# Patient Record
Sex: FEMALE | Race: WHITE | Employment: UNEMPLOYED | URBAN - METROPOLITAN AREA
[De-identification: names, ages, dates, MRNs, and addresses within clinical notes are randomized per-mention and may not be internally consistent; named-entity substitution may affect disease eponyms.]

---

## 2017-03-06 ENCOUNTER — OFFICE VISIT (OUTPATIENT)
Dept: OBGYN CLINIC | Facility: CLINIC | Age: 22
End: 2017-03-06

## 2017-03-06 VITALS
DIASTOLIC BLOOD PRESSURE: 62 MMHG | HEIGHT: 64 IN | SYSTOLIC BLOOD PRESSURE: 100 MMHG | WEIGHT: 122 LBS | BODY MASS INDEX: 20.83 KG/M2

## 2017-03-06 DIAGNOSIS — Z30.013 ENCOUNTER FOR INITIAL PRESCRIPTION OF INJECTABLE CONTRACEPTIVE: Primary | ICD-10-CM

## 2017-03-06 DIAGNOSIS — N92.1 METRORRHAGIA: ICD-10-CM

## 2017-03-06 DIAGNOSIS — F32.0 MILD SINGLE CURRENT EPISODE OF MAJOR DEPRESSIVE DISORDER (HCC): ICD-10-CM

## 2017-03-06 PROCEDURE — 96372 THER/PROPH/DIAG INJ SC/IM: CPT | Performed by: OBSTETRICS & GYNECOLOGY

## 2017-03-06 PROCEDURE — 99213 OFFICE O/P EST LOW 20 MIN: CPT | Performed by: OBSTETRICS & GYNECOLOGY

## 2017-03-06 RX ORDER — MEDROXYPROGESTERONE ACETATE 10 MG/1
10 TABLET ORAL DAILY
Qty: 10 TABLET | Refills: 0 | Status: SHIPPED | OUTPATIENT
Start: 2017-03-06 | End: 2017-03-16

## 2017-03-06 RX ORDER — MEDROXYPROGESTERONE ACETATE 150 MG/ML
150 INJECTION, SUSPENSION INTRAMUSCULAR ONCE
Status: COMPLETED | OUTPATIENT
Start: 2017-03-06 | End: 2017-03-06

## 2017-03-06 RX ORDER — SERTRALINE HYDROCHLORIDE 25 MG/1
TABLET, FILM COATED ORAL
Qty: 120 TABLET | Refills: 4 | Status: SHIPPED | OUTPATIENT
Start: 2017-03-06 | End: 2017-03-07 | Stop reason: CLARIF

## 2017-03-06 RX ADMIN — MEDROXYPROGESTERONE ACETATE 150 MG: 150 INJECTION, SUSPENSION INTRAMUSCULAR at 14:09:00

## 2017-03-06 NOTE — PROGRESS NOTES
GYNECOLOGY RETURN VISIT    3/6/2017  1:51 PM    CC:Patient presents for prolonged bleeding     HPI: Patient is a 22year old  here for management of depoprovera.   She has noticed that about every 3 months when her depo is due she has prolonged spott Breastfeeding? No          A/P: Patient is 22year old female here for prolonged bleeding on depo. Plan to check gc/chl.  I will give her provera if her bleeding persists. I dw her other bc options, but she declines due to her h/o migraine HA.   I also dw h

## 2017-03-07 ENCOUNTER — TELEPHONE (OUTPATIENT)
Dept: OBGYN CLINIC | Facility: CLINIC | Age: 22
End: 2017-03-07

## 2017-03-07 RX ORDER — SERTRALINE HYDROCHLORIDE 25 MG/1
25 TABLET, FILM COATED ORAL DAILY
Qty: 120 TABLET | Refills: 3 | Status: SHIPPED | OUTPATIENT
Start: 2017-03-07 | End: 2017-08-11

## 2017-05-23 ENCOUNTER — NURSE ONLY (OUTPATIENT)
Dept: OBGYN CLINIC | Facility: CLINIC | Age: 22
End: 2017-05-23

## 2017-05-23 DIAGNOSIS — Z30.013 INITIATION OF DEPO PROVERA: ICD-10-CM

## 2017-05-23 PROCEDURE — 96372 THER/PROPH/DIAG INJ SC/IM: CPT | Performed by: OBSTETRICS & GYNECOLOGY

## 2017-05-23 RX ORDER — MEDROXYPROGESTERONE ACETATE 150 MG/ML
150 INJECTION, SUSPENSION INTRAMUSCULAR ONCE
Status: COMPLETED | OUTPATIENT
Start: 2017-05-23 | End: 2017-05-23

## 2017-05-23 RX ADMIN — MEDROXYPROGESTERONE ACETATE 150 MG: 150 INJECTION, SUSPENSION INTRAMUSCULAR at 09:18:00

## 2017-08-11 ENCOUNTER — OFFICE VISIT (OUTPATIENT)
Dept: OBGYN CLINIC | Facility: CLINIC | Age: 22
End: 2017-08-11

## 2017-08-11 VITALS
HEIGHT: 64 IN | BODY MASS INDEX: 20.49 KG/M2 | SYSTOLIC BLOOD PRESSURE: 100 MMHG | DIASTOLIC BLOOD PRESSURE: 76 MMHG | WEIGHT: 120 LBS

## 2017-08-11 DIAGNOSIS — Z30.42 ENCOUNTER FOR MANAGEMENT AND INJECTION OF DEPO-PROVERA: Primary | ICD-10-CM

## 2017-08-11 PROCEDURE — 96372 THER/PROPH/DIAG INJ SC/IM: CPT | Performed by: OBSTETRICS & GYNECOLOGY

## 2017-08-11 RX ORDER — MEDROXYPROGESTERONE ACETATE 10 MG/1
10 TABLET ORAL DAILY
Qty: 10 TABLET | Refills: 3 | Status: SHIPPED | OUTPATIENT
Start: 2017-08-11 | End: 2017-08-21

## 2017-08-11 RX ORDER — MEDROXYPROGESTERONE ACETATE 150 MG/ML
150 INJECTION, SUSPENSION INTRAMUSCULAR ONCE
Status: COMPLETED | OUTPATIENT
Start: 2017-08-11 | End: 2017-08-11

## 2017-08-11 RX ADMIN — MEDROXYPROGESTERONE ACETATE 150 MG: 150 INJECTION, SUSPENSION INTRAMUSCULAR at 13:32:00

## 2017-08-11 NOTE — PROGRESS NOTES
GYNECOLOGY RETURN VISIT          2017  1:35 PM    CC:Patient presents for depo injections    HPI: Patient is a 22year old  Patient's last menstrual period was 2017. who presents for above.  Bleeding seems to be better, but she would like

## 2017-10-13 ENCOUNTER — OFFICE VISIT (OUTPATIENT)
Dept: OBGYN CLINIC | Facility: CLINIC | Age: 22
End: 2017-10-13

## 2017-10-13 VITALS — WEIGHT: 120 LBS | SYSTOLIC BLOOD PRESSURE: 100 MMHG | BODY MASS INDEX: 21 KG/M2 | DIASTOLIC BLOOD PRESSURE: 76 MMHG

## 2017-10-13 DIAGNOSIS — N76.4 LABIAL ABSCESS: Primary | ICD-10-CM

## 2017-10-13 PROCEDURE — 99213 OFFICE O/P EST LOW 20 MIN: CPT | Performed by: OBSTETRICS & GYNECOLOGY

## 2017-10-13 RX ORDER — MEDROXYPROGESTERONE ACETATE 150 MG/ML
INJECTION, SUSPENSION INTRAMUSCULAR
COMMUNITY
End: 2017-11-15

## 2017-10-13 RX ORDER — DOXYCYCLINE HYCLATE 100 MG
100 TABLET ORAL 2 TIMES DAILY
Qty: 20 TABLET | Refills: 0 | Status: SHIPPED | OUTPATIENT
Start: 2017-10-13 | End: 2017-10-23

## 2017-10-13 NOTE — PROGRESS NOTES
GYNECOLOGY RETURN VISIT          10/13/2017  1:07 PM    CC:Patient presents l    HPI: Patient is a 22year old  No LMP recorded (lmp unknown). Patient has had an injection.  who presents with lumps on R side of bikini line for 3 W and another on the abscess/hydradenitis. RX sent in for doxy 100 bid x 10 days. FU in 1 week if no improvement.     Jabari Monae MD

## 2017-11-14 ENCOUNTER — APPOINTMENT (OUTPATIENT)
Dept: LAB | Facility: REFERENCE LAB | Age: 22
End: 2017-11-14
Attending: OBSTETRICS & GYNECOLOGY
Payer: COMMERCIAL

## 2017-11-14 ENCOUNTER — TELEPHONE (OUTPATIENT)
Dept: OBGYN CLINIC | Facility: CLINIC | Age: 22
End: 2017-11-14

## 2017-11-14 PROBLEM — N94.10 FEMALE DYSPAREUNIA: Status: ACTIVE | Noted: 2017-11-14

## 2017-11-14 PROBLEM — F32.9 REACTIVE DEPRESSION: Status: ACTIVE | Noted: 2017-11-14

## 2017-11-14 PROCEDURE — 36415 COLL VENOUS BLD VENIPUNCTURE: CPT | Performed by: OBSTETRICS & GYNECOLOGY

## 2017-11-14 PROCEDURE — 84702 CHORIONIC GONADOTROPIN TEST: CPT | Performed by: OBSTETRICS & GYNECOLOGY

## 2017-11-14 NOTE — PROGRESS NOTES
GYNECOLOGY RETURN VISIT          2017  12:13 PM    CC:Patient presents for depo. HPI: Patient is a 25year old  No LMP recorded. Patient has had an injection. who presents for depo. She is two weeks late and needs quant.  She is also concern Birth control/ protection: Depo Provera     Other Topics Concern    Blood Transfusions No     Social History Narrative    No h/o abuse           Review of Systems:    See above HPI. BP 96/62   Ht 64\"   Wt 125 lb   Breastfeeding?  No   BMI 21.46 kg/m²

## 2017-11-14 NOTE — TELEPHONE ENCOUNTER
Fax rec'd from [facility]  for refill on Escitalopram. Dr. Sheryl García saw pt today and refill + 11 was sent to pts [address]

## 2017-11-15 ENCOUNTER — NURSE ONLY (OUTPATIENT)
Dept: OBGYN CLINIC | Facility: CLINIC | Age: 22
End: 2017-11-15

## 2017-11-15 PROCEDURE — 96372 THER/PROPH/DIAG INJ SC/IM: CPT | Performed by: OBSTETRICS & GYNECOLOGY

## 2017-11-15 RX ORDER — MEDROXYPROGESTERONE ACETATE 150 MG/ML
150 INJECTION, SUSPENSION INTRAMUSCULAR ONCE
Status: COMPLETED | OUTPATIENT
Start: 2017-11-15 | End: 2017-11-15

## 2017-11-15 RX ADMIN — MEDROXYPROGESTERONE ACETATE 150 MG: 150 INJECTION, SUSPENSION INTRAMUSCULAR at 16:29:00

## 2017-11-17 ENCOUNTER — TELEPHONE (OUTPATIENT)
Dept: OBGYN CLINIC | Facility: CLINIC | Age: 22
End: 2017-11-17

## 2017-11-17 RX ORDER — ESCITALOPRAM OXALATE 10 MG/1
10 TABLET ORAL DAILY
Qty: 90 TABLET | Refills: 3 | Status: SHIPPED | OUTPATIENT
Start: 2017-11-17 | End: 2019-10-22

## 2017-11-17 NOTE — TELEPHONE ENCOUNTER
Per pt, she would like to change her Escitalopram script to be sent to [facility] as it will be less expensive. 3 month supply sent with 3 refills. Let pt and [facility] pharmacy know.

## 2018-02-14 ENCOUNTER — NURSE ONLY (OUTPATIENT)
Dept: OBGYN CLINIC | Facility: CLINIC | Age: 23
End: 2018-02-14

## 2018-02-14 PROCEDURE — 96372 THER/PROPH/DIAG INJ SC/IM: CPT | Performed by: OBSTETRICS & GYNECOLOGY

## 2018-02-14 RX ORDER — MEDROXYPROGESTERONE ACETATE 150 MG/ML
INJECTION, SUSPENSION INTRAMUSCULAR ONCE
Status: COMPLETED | OUTPATIENT
Start: 2018-02-14 | End: 2018-02-14

## 2018-02-14 RX ADMIN — MEDROXYPROGESTERONE ACETATE 150 MG: 150 INJECTION, SUSPENSION INTRAMUSCULAR at 13:44:00

## 2018-05-11 ENCOUNTER — NURSE ONLY (OUTPATIENT)
Dept: OBGYN CLINIC | Facility: CLINIC | Age: 23
End: 2018-05-11

## 2018-05-11 PROCEDURE — 96372 THER/PROPH/DIAG INJ SC/IM: CPT | Performed by: OBSTETRICS & GYNECOLOGY

## 2018-05-11 RX ORDER — MEDROXYPROGESTERONE ACETATE 150 MG/ML
150 INJECTION, SUSPENSION INTRAMUSCULAR ONCE
Status: COMPLETED | OUTPATIENT
Start: 2018-05-11 | End: 2018-05-11

## 2018-05-11 RX ADMIN — MEDROXYPROGESTERONE ACETATE 150 MG: 150 INJECTION, SUSPENSION INTRAMUSCULAR at 14:07:00

## 2018-08-15 ENCOUNTER — NURSE ONLY (OUTPATIENT)
Dept: OBGYN CLINIC | Facility: CLINIC | Age: 23
End: 2018-08-15
Payer: COMMERCIAL

## 2018-08-15 ENCOUNTER — APPOINTMENT (OUTPATIENT)
Dept: LAB | Facility: REFERENCE LAB | Age: 23
End: 2018-08-15
Attending: OBSTETRICS & GYNECOLOGY
Payer: COMMERCIAL

## 2018-08-15 DIAGNOSIS — Z30.42 DEPO-PROVERA CONTRACEPTIVE STATUS: Primary | ICD-10-CM

## 2018-08-15 LAB — B-HCG SERPL-ACNC: <0.6 MIU/ML

## 2018-08-15 PROCEDURE — 36415 COLL VENOUS BLD VENIPUNCTURE: CPT | Performed by: OBSTETRICS & GYNECOLOGY

## 2018-08-15 PROCEDURE — 84702 CHORIONIC GONADOTROPIN TEST: CPT | Performed by: OBSTETRICS & GYNECOLOGY

## 2018-08-16 ENCOUNTER — NURSE ONLY (OUTPATIENT)
Dept: OBGYN CLINIC | Facility: CLINIC | Age: 23
End: 2018-08-16
Payer: COMMERCIAL

## 2018-08-16 PROCEDURE — 96372 THER/PROPH/DIAG INJ SC/IM: CPT | Performed by: OBSTETRICS & GYNECOLOGY

## 2018-08-16 RX ORDER — MEDROXYPROGESTERONE ACETATE 150 MG/ML
150 INJECTION, SUSPENSION INTRAMUSCULAR ONCE
Status: COMPLETED | OUTPATIENT
Start: 2018-08-16 | End: 2018-08-16

## 2018-08-16 RX ADMIN — MEDROXYPROGESTERONE ACETATE 150 MG: 150 INJECTION, SUSPENSION INTRAMUSCULAR at 13:56:00

## 2018-08-16 NOTE — PROGRESS NOTES
Pt here for Nurse visit for Depo injection. Depo given in left outer glut, pt supriya injection well. Next injection due Nov 1-Nov 15.

## 2018-12-03 ENCOUNTER — OFFICE VISIT (OUTPATIENT)
Dept: OBGYN CLINIC | Facility: CLINIC | Age: 23
End: 2018-12-03
Payer: COMMERCIAL

## 2018-12-03 VITALS — HEIGHT: 64 IN | BODY MASS INDEX: 21 KG/M2 | DIASTOLIC BLOOD PRESSURE: 74 MMHG | SYSTOLIC BLOOD PRESSURE: 108 MMHG

## 2018-12-03 DIAGNOSIS — N90.89 VULVAR LESION: Primary | ICD-10-CM

## 2018-12-03 DIAGNOSIS — L70.0 ACNE VULGARIS: ICD-10-CM

## 2018-12-03 DIAGNOSIS — R68.82 LOW LIBIDO: ICD-10-CM

## 2018-12-03 DIAGNOSIS — Z30.09 FAMILY PLANNING: ICD-10-CM

## 2018-12-03 PROCEDURE — 99214 OFFICE O/P EST MOD 30 MIN: CPT | Performed by: OBSTETRICS & GYNECOLOGY

## 2018-12-03 PROCEDURE — 56405 I&D VULVA/PERINEAL ABSCESS: CPT | Performed by: OBSTETRICS & GYNECOLOGY

## 2018-12-03 RX ORDER — CLINDAMYCIN PHOSPHATE 10 MG/ML
1 LOTION TOPICAL DAILY
Qty: 60 ML | Refills: 0 | Status: SHIPPED | OUTPATIENT
Start: 2018-12-03 | End: 2019-10-22

## 2018-12-03 RX ORDER — ADAPALENE 45 G/G
GEL TOPICAL
Qty: 45 G | Refills: 2 | Status: SHIPPED | OUTPATIENT
Start: 2018-12-03 | End: 2019-10-22

## 2018-12-03 RX ORDER — CLONAZEPAM 0.5 MG/1
TABLET ORAL
Refills: 3 | COMMUNITY
Start: 2018-10-01 | End: 2019-10-22

## 2018-12-03 RX ORDER — NORGESTIMATE AND ETHINYL ESTRADIOL 7DAYSX3 28
1 KIT ORAL DAILY
Qty: 28 TABLET | Refills: 11 | Status: SHIPPED | OUTPATIENT
Start: 2018-12-03 | End: 2018-12-31

## 2018-12-03 NOTE — PROGRESS NOTES
CC: Patient is here for blocked sweat glands L groin area. HPI: Patient is a 26year old  for above. Patient had a previous problem and was treated with oral antibiotics 6 M ago.      She also would like to switch off of depo b/c she feels like it inability: Not on file      Transportation needs - medical: Not on file      Transportation needs - non-medical: Not on file    Occupational History      Occupation: student,     Tobacco Use      Smoking status: Never Smoker      Smokeless tobacc Depoprovera, IUD (Mirena/Paragard), and Nexplanon. Risks v. Benefits dw patient. I also dw pt use of condoms to prevent STI's. She would like OCP's.  I dw her use and SE.    4.) depression - much better on Lexapro.    5.) acne - adapalene and clindamycin

## 2019-10-22 ENCOUNTER — OFFICE VISIT (OUTPATIENT)
Dept: OBGYN CLINIC | Facility: CLINIC | Age: 24
End: 2019-10-22
Payer: COMMERCIAL

## 2019-10-22 VITALS
WEIGHT: 114 LBS | DIASTOLIC BLOOD PRESSURE: 80 MMHG | HEIGHT: 64 IN | SYSTOLIC BLOOD PRESSURE: 120 MMHG | BODY MASS INDEX: 19.46 KG/M2

## 2019-10-22 DIAGNOSIS — Z01.419 ENCOUNTER FOR GYNECOLOGICAL EXAMINATION WITHOUT ABNORMAL FINDING: Primary | ICD-10-CM

## 2019-10-22 DIAGNOSIS — Z30.09 FAMILY PLANNING: ICD-10-CM

## 2019-10-22 PROCEDURE — 87491 CHLMYD TRACH DNA AMP PROBE: CPT | Performed by: OBSTETRICS & GYNECOLOGY

## 2019-10-22 PROCEDURE — 99395 PREV VISIT EST AGE 18-39: CPT | Performed by: OBSTETRICS & GYNECOLOGY

## 2019-10-22 PROCEDURE — 87591 N.GONORRHOEAE DNA AMP PROB: CPT | Performed by: OBSTETRICS & GYNECOLOGY

## 2019-10-22 PROCEDURE — 88175 CYTOPATH C/V AUTO FLUID REDO: CPT | Performed by: OBSTETRICS & GYNECOLOGY

## 2019-10-22 RX ORDER — MISOPROSTOL 200 UG/1
TABLET ORAL
Qty: 2 TABLET | Refills: 0 | Status: SHIPPED | OUTPATIENT
Start: 2019-10-22 | End: 2019-10-29

## 2019-10-22 RX ORDER — NORGESTIMATE AND ETHINYL ESTRADIOL 7DAYSX3 28
KIT ORAL
Refills: 10 | COMMUNITY
Start: 2019-10-04 | End: 2019-10-29

## 2019-10-25 NOTE — PROGRESS NOTES
Results reviewed with pt and pt scheduled for colpo on 10/29. Pt verbalized understanding and agrees with plan of care.

## 2019-10-29 ENCOUNTER — OFFICE VISIT (OUTPATIENT)
Dept: OBGYN CLINIC | Facility: CLINIC | Age: 24
End: 2019-10-29
Payer: COMMERCIAL

## 2019-10-29 VITALS
WEIGHT: 114 LBS | HEIGHT: 64 IN | DIASTOLIC BLOOD PRESSURE: 80 MMHG | BODY MASS INDEX: 19.46 KG/M2 | SYSTOLIC BLOOD PRESSURE: 120 MMHG

## 2019-10-29 DIAGNOSIS — Z01.419 ENCOUNTER FOR GYNECOLOGICAL EXAMINATION WITHOUT ABNORMAL FINDING: Primary | ICD-10-CM

## 2019-10-29 DIAGNOSIS — R87.611 PAP SMEAR OF CERVIX WITH ASCUS, CANNOT EXCLUDE HGSIL: ICD-10-CM

## 2019-10-29 PROCEDURE — 57454 BX/CURETT OF CERVIX W/SCOPE: CPT | Performed by: OBSTETRICS & GYNECOLOGY

## 2019-10-29 PROCEDURE — 88305 TISSUE EXAM BY PATHOLOGIST: CPT | Performed by: OBSTETRICS & GYNECOLOGY

## 2019-10-29 PROCEDURE — 90651 9VHPV VACCINE 2/3 DOSE IM: CPT | Performed by: OBSTETRICS & GYNECOLOGY

## 2019-10-29 PROCEDURE — 81025 URINE PREGNANCY TEST: CPT | Performed by: OBSTETRICS & GYNECOLOGY

## 2019-10-29 PROCEDURE — 90471 IMMUNIZATION ADMIN: CPT | Performed by: OBSTETRICS & GYNECOLOGY

## 2019-10-29 NOTE — PROGRESS NOTES
Colposcopy    Pap = ASCUS cannot r/o HGSIL. She recently found out she did not do Gardasil at all. Patient was positioned in stir-ups. She was consented for colposcopy and possible biopsies.  I discussed with her to expect some cramping and light vagi

## 2019-11-01 ENCOUNTER — TELEPHONE (OUTPATIENT)
Dept: OBGYN CLINIC | Facility: CLINIC | Age: 24
End: 2019-11-01

## 2019-11-05 NOTE — TELEPHONE ENCOUNTER
Called and dw pt biopsies c/w SARAHI 1 - 2. I dw her that on colposcopy she had a large area of her cervix which appeared to be involved, so I do not recommend conservative management. Plan office LEEP.  I dw pt the procedure, preop, postop and risks including

## 2019-11-14 ENCOUNTER — OFFICE VISIT (OUTPATIENT)
Dept: OBGYN CLINIC | Facility: CLINIC | Age: 24
End: 2019-11-14
Payer: COMMERCIAL

## 2019-11-14 VITALS
HEIGHT: 64 IN | SYSTOLIC BLOOD PRESSURE: 110 MMHG | BODY MASS INDEX: 20.14 KG/M2 | DIASTOLIC BLOOD PRESSURE: 78 MMHG | WEIGHT: 118 LBS

## 2019-11-14 DIAGNOSIS — N87.1 MODERATE DYSPLASIA OF CERVIX (CIN II): Primary | ICD-10-CM

## 2019-11-14 PROCEDURE — 81025 URINE PREGNANCY TEST: CPT | Performed by: OBSTETRICS & GYNECOLOGY

## 2019-11-14 PROCEDURE — 88307 TISSUE EXAM BY PATHOLOGIST: CPT | Performed by: OBSTETRICS & GYNECOLOGY

## 2019-11-14 PROCEDURE — 57522 CONIZATION OF CERVIX: CPT | Performed by: OBSTETRICS & GYNECOLOGY

## 2019-11-14 NOTE — PROGRESS NOTES
Pap = ASCUS cannot r./o HGSIL    Colpo bx = rick ii    Here for LEEP. I dw pt the pathology results, how the LEEP is performed, and to expect some light vaginal bleeding and lower abdominal cramping.  I discussed the risks of the procedure including bleedin

## 2019-12-03 ENCOUNTER — OFFICE VISIT (OUTPATIENT)
Dept: OBGYN CLINIC | Facility: CLINIC | Age: 24
End: 2019-12-03
Payer: COMMERCIAL

## 2019-12-03 VITALS — BODY MASS INDEX: 20 KG/M2 | DIASTOLIC BLOOD PRESSURE: 78 MMHG | SYSTOLIC BLOOD PRESSURE: 112 MMHG | HEIGHT: 64 IN

## 2019-12-03 DIAGNOSIS — Z09 POSTOP CHECK: Primary | ICD-10-CM

## 2019-12-03 DIAGNOSIS — Z30.430 ENCOUNTER FOR INSERTION OF INTRAUTERINE CONTRACEPTIVE DEVICE: ICD-10-CM

## 2019-12-03 PROBLEM — D06.9 CIN III (CERVICAL INTRAEPITHELIAL NEOPLASIA GRADE III) WITH SEVERE DYSPLASIA: Status: ACTIVE | Noted: 2019-12-03

## 2019-12-03 PROCEDURE — 99024 POSTOP FOLLOW-UP VISIT: CPT | Performed by: OBSTETRICS & GYNECOLOGY

## 2019-12-03 PROCEDURE — 58300 INSERT INTRAUTERINE DEVICE: CPT | Performed by: OBSTETRICS & GYNECOLOGY

## 2019-12-03 RX ORDER — DEXTROAMPHETAMINE SACCHARATE, AMPHETAMINE ASPARTATE, DEXTROAMPHETAMINE SULFATE AND AMPHETAMINE SULFATE 5; 5; 5; 5 MG/1; MG/1; MG/1; MG/1
TABLET ORAL
Refills: 0 | COMMUNITY
Start: 2019-12-01

## 2019-12-03 NOTE — PROGRESS NOTES
CC: Patient is here for postop FU    HPI: Patient is a 22year old  for postop FU after LEEP. Some cramping and spotting. No heavy bleeding. Would like Mirena IUD placed. Has not had sex since prior to the procedure. No LMP recorded.  Patient has Not on file      Intimate partner violence:        Fear of current or ex partner: Not on file        Emotionally abused: Not on file        Physically abused: Not on file        Forced sexual activity: Not on file    Other Topics      Concerns:        Melanie bleeding which could last up to 90 days as well as some short term lower abdominal cramping. I discussed with her to call me if she noticed worsening lower abdominal pain, fever, chills, nausea, vomiting, or a foul vaginal discharge.   I also discussed wit

## 2020-01-08 ENCOUNTER — NURSE ONLY (OUTPATIENT)
Dept: OBGYN CLINIC | Facility: CLINIC | Age: 25
End: 2020-01-08
Payer: COMMERCIAL

## 2020-01-08 PROCEDURE — 90651 9VHPV VACCINE 2/3 DOSE IM: CPT | Performed by: OBSTETRICS & GYNECOLOGY

## 2020-01-08 PROCEDURE — 90471 IMMUNIZATION ADMIN: CPT | Performed by: OBSTETRICS & GYNECOLOGY

## 2020-01-08 NOTE — PROGRESS NOTES
Pt here for nurse visit for 2nd gardasil injection given left arm . Pt denies any allergies two identifiers verified with pt. Pt tolerated injection well RTO 04/2020 for last Injection.

## 2020-05-26 ENCOUNTER — OFFICE VISIT (OUTPATIENT)
Dept: OBGYN CLINIC | Facility: CLINIC | Age: 25
End: 2020-05-26
Payer: COMMERCIAL

## 2020-05-26 VITALS
DIASTOLIC BLOOD PRESSURE: 70 MMHG | BODY MASS INDEX: 19.31 KG/M2 | HEIGHT: 64 IN | WEIGHT: 113.13 LBS | SYSTOLIC BLOOD PRESSURE: 116 MMHG

## 2020-05-26 DIAGNOSIS — D06.9 CIN III (CERVICAL INTRAEPITHELIAL NEOPLASIA GRADE III) WITH SEVERE DYSPLASIA: Primary | ICD-10-CM

## 2020-05-26 PROCEDURE — 90471 IMMUNIZATION ADMIN: CPT | Performed by: OBSTETRICS & GYNECOLOGY

## 2020-05-26 PROCEDURE — 90651 9VHPV VACCINE 2/3 DOSE IM: CPT | Performed by: OBSTETRICS & GYNECOLOGY

## 2020-05-26 PROCEDURE — 99213 OFFICE O/P EST LOW 20 MIN: CPT | Performed by: OBSTETRICS & GYNECOLOGY

## 2020-05-26 PROCEDURE — 88175 CYTOPATH C/V AUTO FLUID REDO: CPT | Performed by: OBSTETRICS & GYNECOLOGY

## 2020-05-26 PROCEDURE — 87624 HPV HI-RISK TYP POOLED RSLT: CPT | Performed by: OBSTETRICS & GYNECOLOGY

## 2020-05-26 NOTE — PROGRESS NOTES
3rd Gardasil 9 Vaccine administered in Left arm IM  Vaccine given at 10:19AM.  Patient tolerated well no reaction at this time. 2 patient identifiers verified with pt. Ordering Dr. Lissett Sow.

## 2020-05-26 NOTE — PROGRESS NOTES
CC: Patient is here for pap    HPI: Patient is a 22year old  for FU pap after LEEP. Also getting Gardasil #3. She is not getting periods with Mirena IUD. No LMP recorded (lmp unknown). (Menstrual status: IUD - Intrauterine Device).     OB Hist activity: Yes        Partners: Male        Birth control/protection: Mirena    Lifestyle      Physical activity:        Days per week: Not on file        Minutes per session: Not on file      Stress: Not on file    Relationships      Social connections: non-tender, firm and smooth  Cervix: pink, no lesions grossly visible, no discharge, IUD string visible. Adnexa: non tender, no palpable masses, normal size  Anus:  No lesions or visible hemorrhoids        A/P: Patient is 22year old female     1.  SARAHI II

## 2020-12-08 PROBLEM — F32.81 PMDD (PREMENSTRUAL DYSPHORIC DISORDER): Status: ACTIVE | Noted: 2020-12-08

## 2020-12-08 NOTE — PROGRESS NOTES
CC: Patient is here for PMDD    HPI: Patient is a 26year old  for possible PMDD.  She has Mirena IUD    She describes more noticeable in the past year PMDD.     2 W prior to her period she feels like her ADHD worsens, she is \"down in the dumps,\" e Past Surgical History:   Procedure Laterality Date   • COLPOSCOPY, CERVIX W/UPPER ADJACENT VAGINA; W/BIOPSY(S), CERVIX  10/29/2019   • INSERT INTRAUTERINE DEVICE  12/2019    Mirena IUD    • LEEP  11/2019    SARAHI iii with positive margins   • TONSILLECTO Caffeine Concern: Not Asked        Occupational Exposure: Not Asked        Hobby Hazards: Not Asked        Sleep Concern: Not Asked        Stress Concern: Not Asked        Weight Concern: Not Asked        Special Diet: Not Asked        Back Care: Not Asked

## 2021-01-28 ENCOUNTER — PATIENT MESSAGE (OUTPATIENT)
Dept: OBGYN CLINIC | Facility: CLINIC | Age: 26
End: 2021-01-28

## 2021-01-28 NOTE — TELEPHONE ENCOUNTER
LILIANAM that scheduled for tomorrow with [facility] and my chart message. Eunice Boston,      I tried to call you to assist you with finding an appointment but instead left a message. I placed you in a slot for tomorrow at 1:30 pm with Dr. Ramirez Xie.   Please call

## 2021-01-29 ENCOUNTER — PATIENT MESSAGE (OUTPATIENT)
Dept: OBGYN CLINIC | Facility: CLINIC | Age: 26
End: 2021-01-29

## 2021-01-29 ENCOUNTER — OFFICE VISIT (OUTPATIENT)
Dept: OBGYN CLINIC | Facility: CLINIC | Age: 26
End: 2021-01-29
Payer: COMMERCIAL

## 2021-01-29 VITALS
DIASTOLIC BLOOD PRESSURE: 70 MMHG | SYSTOLIC BLOOD PRESSURE: 100 MMHG | HEIGHT: 64 IN | BODY MASS INDEX: 19.46 KG/M2 | WEIGHT: 114 LBS

## 2021-01-29 DIAGNOSIS — R10.2 PELVIC PAIN: Primary | ICD-10-CM

## 2021-01-29 DIAGNOSIS — Z30.09 FAMILY PLANNING: ICD-10-CM

## 2021-01-29 DIAGNOSIS — Z30.432 ENCOUNTER FOR REMOVAL OF INTRAUTERINE CONTRACEPTIVE DEVICE: ICD-10-CM

## 2021-01-29 PROCEDURE — 58301 REMOVE INTRAUTERINE DEVICE: CPT | Performed by: OBSTETRICS & GYNECOLOGY

## 2021-01-29 PROCEDURE — 3074F SYST BP LT 130 MM HG: CPT | Performed by: OBSTETRICS & GYNECOLOGY

## 2021-01-29 PROCEDURE — 3008F BODY MASS INDEX DOCD: CPT | Performed by: OBSTETRICS & GYNECOLOGY

## 2021-01-29 PROCEDURE — 87591 N.GONORRHOEAE DNA AMP PROB: CPT | Performed by: OBSTETRICS & GYNECOLOGY

## 2021-01-29 PROCEDURE — 99214 OFFICE O/P EST MOD 30 MIN: CPT | Performed by: OBSTETRICS & GYNECOLOGY

## 2021-01-29 PROCEDURE — 3078F DIAST BP <80 MM HG: CPT | Performed by: OBSTETRICS & GYNECOLOGY

## 2021-01-29 PROCEDURE — 87491 CHLMYD TRACH DNA AMP PROBE: CPT | Performed by: OBSTETRICS & GYNECOLOGY

## 2021-01-29 RX ORDER — NORGESTIMATE AND ETHINYL ESTRADIOL 7DAYSX3 LO
1 KIT ORAL DAILY
Qty: 1 PACKAGE | Refills: 11 | Status: SHIPPED | OUTPATIENT
Start: 2021-01-29 | End: 2021-02-26

## 2021-01-29 RX ORDER — ACETAMINOPHEN 500 MG
500 TABLET ORAL EVERY 6 HOURS PRN
COMMUNITY
End: 2021-12-06

## 2021-01-29 NOTE — TELEPHONE ENCOUNTER
RN contacted pt. Pt requesting an alternate location to have ultrasound done sooner than her scheduled US on 2/8/21.  RN placed pelvic transvaginal ultrasound order and provided number to central scheduling so pt could call to schedule ultrasound at [facility]

## 2021-01-29 NOTE — TELEPHONE ENCOUNTER
From: Tereso Schuler  To:  Airam Darling MD  Sent: 1/29/2021 2:36 PM CST  Subject: Referral Request    I am scheduled for a US TRANSVAGINAL EMG ONLY and I wasn't able to get an appointment until February 8th and I was wondering if there w

## 2021-01-29 NOTE — PROGRESS NOTES
CC: Patient is here for Left sided pelvic pain. HPI: Patient is a 26year old  with Mirena IUD x 1 year presents with  LLQ pain x 1. 5 W described as constant crampy pain which can get worse with bloating. Pain can be 4-7/10 in severity.  She has intraepithelial lesion (HGSIL) on cervicovaginal cytology 10/22/2019    ASC-H   • History of use of contraceptive intrauterine device (IUD) 12/03/2019    Mirena IUD inserted 12/2019   • Migraine      Past Surgical History:   Procedure Laterality Date   • C on file        Physically abused: Not on file        Forced sexual activity: Not on file    Other Topics      Concerns:         Service: Not Asked        Blood Transfusions: No        Caffeine Concern: Not Asked        Occupational Exposure: Not As Refill: 11    I dw that Sujata Frey may not be causing pain, but she would like it removed. I dw her bc option and she would like to use OCP's. I dw her use and SE. I dw her that her pain is likely due to an ovarian cyst which usually resolve.  Plan to check us

## 2021-02-01 ENCOUNTER — HOSPITAL ENCOUNTER (OUTPATIENT)
Dept: ULTRASOUND IMAGING | Facility: HOSPITAL | Age: 26
Discharge: HOME | End: 2021-02-01
Attending: OBSTETRICS & GYNECOLOGY
Payer: COMMERCIAL

## 2021-02-01 ENCOUNTER — PATIENT MESSAGE (OUTPATIENT)
Dept: OBGYN CLINIC | Facility: CLINIC | Age: 26
End: 2021-02-01

## 2021-02-01 DIAGNOSIS — R10.2 PELVIC PAIN: ICD-10-CM

## 2021-02-01 PROCEDURE — 76830 TRANSVAGINAL US NON-OB: CPT | Performed by: OBSTETRICS & GYNECOLOGY

## 2021-02-01 PROCEDURE — 76856 US EXAM PELVIC COMPLETE: CPT | Performed by: OBSTETRICS & GYNECOLOGY

## 2021-02-01 NOTE — TELEPHONE ENCOUNTER
Called and dw pt USN findings of L ovarian hemorrhagic cyst. Patient is continuing to have significant pain not relieved with tylenol or motrin and would like to proceed surgically.  I dw her that the vast majority of pts who have this usually have spontane

## 2021-02-02 LAB
C TRACH DNA SPEC QL NAA+PROBE: NEGATIVE
N GONORRHOEA DNA SPEC QL NAA+PROBE: NEGATIVE

## 2021-02-03 ENCOUNTER — LAB ENCOUNTER (OUTPATIENT)
Dept: LAB | Age: 26
End: 2021-02-03
Attending: OBSTETRICS & GYNECOLOGY
Payer: COMMERCIAL

## 2021-02-03 DIAGNOSIS — Z01.818 PRE-OP TESTING: ICD-10-CM

## 2021-02-03 RX ORDER — IBUPROFEN 200 MG
200 TABLET ORAL EVERY 6 HOURS PRN
Status: ON HOLD | COMMUNITY
End: 2021-02-05

## 2021-02-04 LAB — SARS-COV-2 RNA RESP QL NAA+PROBE: NOT DETECTED

## 2021-02-05 ENCOUNTER — HOSPITAL ENCOUNTER (OUTPATIENT)
Facility: HOSPITAL | Age: 26
Setting detail: HOSPITAL OUTPATIENT SURGERY
Discharge: HOME | End: 2021-02-05
Attending: OBSTETRICS & GYNECOLOGY | Admitting: OBSTETRICS & GYNECOLOGY
Payer: COMMERCIAL

## 2021-02-05 ENCOUNTER — ANESTHESIA EVENT (OUTPATIENT)
Dept: SURGERY | Facility: HOSPITAL | Age: 26
End: 2021-02-05
Payer: COMMERCIAL

## 2021-02-05 ENCOUNTER — ANESTHESIA (OUTPATIENT)
Dept: SURGERY | Facility: HOSPITAL | Age: 26
End: 2021-02-05
Payer: COMMERCIAL

## 2021-02-05 VITALS
TEMPERATURE: 98 F | BODY MASS INDEX: 19.97 KG/M2 | RESPIRATION RATE: 16 BRPM | HEIGHT: 64 IN | OXYGEN SATURATION: 96 % | DIASTOLIC BLOOD PRESSURE: 74 MMHG | WEIGHT: 117 LBS | SYSTOLIC BLOOD PRESSURE: 109 MMHG | HEART RATE: 57 BPM

## 2021-02-05 DIAGNOSIS — Z01.818 PRE-OP TESTING: Primary | ICD-10-CM

## 2021-02-05 PROBLEM — N83.202 CYST OF LEFT OVARY: Status: ACTIVE | Noted: 2021-02-05

## 2021-02-05 PROBLEM — R10.2 PELVIC PAIN: Status: ACTIVE | Noted: 2021-02-05

## 2021-02-05 LAB — B-HCG UR QL: NEGATIVE

## 2021-02-05 PROCEDURE — 0UB14ZZ EXCISION OF LEFT OVARY, PERCUTANEOUS ENDOSCOPIC APPROACH: ICD-10-PCS | Performed by: OBSTETRICS & GYNECOLOGY

## 2021-02-05 PROCEDURE — 58662 LAPAROSCOPY EXCISE LESIONS: CPT | Performed by: OBSTETRICS & GYNECOLOGY

## 2021-02-05 RX ORDER — HYDROCODONE BITARTRATE AND ACETAMINOPHEN 5; 325 MG/1; MG/1
2 TABLET ORAL AS NEEDED
Status: COMPLETED | OUTPATIENT
Start: 2021-02-05 | End: 2021-02-05

## 2021-02-05 RX ORDER — SODIUM CHLORIDE, SODIUM LACTATE, POTASSIUM CHLORIDE, CALCIUM CHLORIDE 600; 310; 30; 20 MG/100ML; MG/100ML; MG/100ML; MG/100ML
INJECTION, SOLUTION INTRAVENOUS CONTINUOUS
Status: DISCONTINUED | OUTPATIENT
Start: 2021-02-05 | End: 2021-02-05

## 2021-02-05 RX ORDER — MORPHINE SULFATE 4 MG/ML
4 INJECTION, SOLUTION INTRAMUSCULAR; INTRAVENOUS EVERY 10 MIN PRN
Status: DISCONTINUED | OUTPATIENT
Start: 2021-02-05 | End: 2021-02-05

## 2021-02-05 RX ORDER — IBUPROFEN 600 MG/1
600 TABLET ORAL EVERY 6 HOURS PRN
Qty: 30 TABLET | Refills: 0 | Status: SHIPPED | OUTPATIENT
Start: 2021-02-05 | End: 2021-12-06

## 2021-02-05 RX ORDER — MORPHINE SULFATE 4 MG/ML
2 INJECTION, SOLUTION INTRAMUSCULAR; INTRAVENOUS EVERY 10 MIN PRN
Status: DISCONTINUED | OUTPATIENT
Start: 2021-02-05 | End: 2021-02-05

## 2021-02-05 RX ORDER — LIDOCAINE HYDROCHLORIDE 10 MG/ML
INJECTION, SOLUTION EPIDURAL; INFILTRATION; INTRACAUDAL; PERINEURAL AS NEEDED
Status: DISCONTINUED | OUTPATIENT
Start: 2021-02-05 | End: 2021-02-05 | Stop reason: SURG

## 2021-02-05 RX ORDER — ONDANSETRON 4 MG/1
4 TABLET, FILM COATED ORAL EVERY 8 HOURS PRN
Status: CANCELLED | OUTPATIENT
Start: 2021-02-05

## 2021-02-05 RX ORDER — MORPHINE SULFATE 10 MG/ML
6 INJECTION, SOLUTION INTRAMUSCULAR; INTRAVENOUS EVERY 10 MIN PRN
Status: DISCONTINUED | OUTPATIENT
Start: 2021-02-05 | End: 2021-02-05

## 2021-02-05 RX ORDER — IBUPROFEN 600 MG/1
600 TABLET ORAL EVERY 6 HOURS
Status: CANCELLED | OUTPATIENT
Start: 2021-02-05

## 2021-02-05 RX ORDER — HYDROCODONE BITARTRATE AND ACETAMINOPHEN 5; 325 MG/1; MG/1
1 TABLET ORAL EVERY 6 HOURS PRN
Status: CANCELLED | OUTPATIENT
Start: 2021-02-05

## 2021-02-05 RX ORDER — GLYCOPYRROLATE 0.2 MG/ML
INJECTION, SOLUTION INTRAMUSCULAR; INTRAVENOUS AS NEEDED
Status: DISCONTINUED | OUTPATIENT
Start: 2021-02-05 | End: 2021-02-05 | Stop reason: SURG

## 2021-02-05 RX ORDER — DEXAMETHASONE SODIUM PHOSPHATE 4 MG/ML
VIAL (ML) INJECTION AS NEEDED
Status: DISCONTINUED | OUTPATIENT
Start: 2021-02-05 | End: 2021-02-05 | Stop reason: SURG

## 2021-02-05 RX ORDER — HYDROMORPHONE HYDROCHLORIDE 1 MG/ML
0.2 INJECTION, SOLUTION INTRAMUSCULAR; INTRAVENOUS; SUBCUTANEOUS EVERY 5 MIN PRN
Status: DISCONTINUED | OUTPATIENT
Start: 2021-02-05 | End: 2021-02-05

## 2021-02-05 RX ORDER — MIDAZOLAM HYDROCHLORIDE 1 MG/ML
INJECTION INTRAMUSCULAR; INTRAVENOUS AS NEEDED
Status: DISCONTINUED | OUTPATIENT
Start: 2021-02-05 | End: 2021-02-05 | Stop reason: SURG

## 2021-02-05 RX ORDER — ROCURONIUM BROMIDE 10 MG/ML
INJECTION, SOLUTION INTRAVENOUS AS NEEDED
Status: DISCONTINUED | OUTPATIENT
Start: 2021-02-05 | End: 2021-02-05 | Stop reason: SURG

## 2021-02-05 RX ORDER — ACETAMINOPHEN 325 MG/1
650 TABLET ORAL EVERY 6 HOURS PRN
Status: CANCELLED | OUTPATIENT
Start: 2021-02-05

## 2021-02-05 RX ORDER — ONDANSETRON 2 MG/ML
INJECTION INTRAMUSCULAR; INTRAVENOUS AS NEEDED
Status: DISCONTINUED | OUTPATIENT
Start: 2021-02-05 | End: 2021-02-05 | Stop reason: SURG

## 2021-02-05 RX ORDER — NEOSTIGMINE METHYLSULFATE 1 MG/ML
INJECTION INTRAVENOUS AS NEEDED
Status: DISCONTINUED | OUTPATIENT
Start: 2021-02-05 | End: 2021-02-05 | Stop reason: SURG

## 2021-02-05 RX ORDER — BUPIVACAINE HYDROCHLORIDE 2.5 MG/ML
INJECTION, SOLUTION EPIDURAL; INFILTRATION; INTRACAUDAL AS NEEDED
Status: DISCONTINUED | OUTPATIENT
Start: 2021-02-05 | End: 2021-02-05 | Stop reason: HOSPADM

## 2021-02-05 RX ORDER — ONDANSETRON 2 MG/ML
4 INJECTION INTRAMUSCULAR; INTRAVENOUS ONCE AS NEEDED
Status: DISCONTINUED | OUTPATIENT
Start: 2021-02-05 | End: 2021-02-05

## 2021-02-05 RX ORDER — HYDROCODONE BITARTRATE AND ACETAMINOPHEN 5; 325 MG/1; MG/1
1 TABLET ORAL AS NEEDED
Status: COMPLETED | OUTPATIENT
Start: 2021-02-05 | End: 2021-02-05

## 2021-02-05 RX ORDER — HYDROMORPHONE HYDROCHLORIDE 1 MG/ML
0.4 INJECTION, SOLUTION INTRAMUSCULAR; INTRAVENOUS; SUBCUTANEOUS EVERY 5 MIN PRN
Status: DISCONTINUED | OUTPATIENT
Start: 2021-02-05 | End: 2021-02-05

## 2021-02-05 RX ORDER — NALOXONE HYDROCHLORIDE 0.4 MG/ML
80 INJECTION, SOLUTION INTRAMUSCULAR; INTRAVENOUS; SUBCUTANEOUS AS NEEDED
Status: DISCONTINUED | OUTPATIENT
Start: 2021-02-05 | End: 2021-02-05

## 2021-02-05 RX ORDER — HYDROMORPHONE HYDROCHLORIDE 1 MG/ML
0.6 INJECTION, SOLUTION INTRAMUSCULAR; INTRAVENOUS; SUBCUTANEOUS EVERY 5 MIN PRN
Status: DISCONTINUED | OUTPATIENT
Start: 2021-02-05 | End: 2021-02-05

## 2021-02-05 RX ORDER — KETOROLAC TROMETHAMINE 30 MG/ML
INJECTION, SOLUTION INTRAMUSCULAR; INTRAVENOUS AS NEEDED
Status: DISCONTINUED | OUTPATIENT
Start: 2021-02-05 | End: 2021-02-05 | Stop reason: SURG

## 2021-02-05 RX ORDER — ACETAMINOPHEN 500 MG
1000 TABLET ORAL ONCE
Status: COMPLETED | OUTPATIENT
Start: 2021-02-05 | End: 2021-02-05

## 2021-02-05 RX ORDER — HYDROCODONE BITARTRATE AND ACETAMINOPHEN 5; 325 MG/1; MG/1
2 TABLET ORAL EVERY 6 HOURS PRN
Status: CANCELLED | OUTPATIENT
Start: 2021-02-05

## 2021-02-05 RX ORDER — HALOPERIDOL 5 MG/ML
0.25 INJECTION INTRAMUSCULAR ONCE AS NEEDED
Status: DISCONTINUED | OUTPATIENT
Start: 2021-02-05 | End: 2021-02-05

## 2021-02-05 RX ORDER — ONDANSETRON 2 MG/ML
4 INJECTION INTRAMUSCULAR; INTRAVENOUS EVERY 8 HOURS PRN
Status: CANCELLED | OUTPATIENT
Start: 2021-02-05

## 2021-02-05 RX ORDER — PROCHLORPERAZINE EDISYLATE 5 MG/ML
5 INJECTION INTRAMUSCULAR; INTRAVENOUS ONCE AS NEEDED
Status: DISCONTINUED | OUTPATIENT
Start: 2021-02-05 | End: 2021-02-05

## 2021-02-05 RX ADMIN — DEXAMETHASONE SODIUM PHOSPHATE 8 MG: 4 MG/ML VIAL (ML) INJECTION at 09:37:00

## 2021-02-05 RX ADMIN — LIDOCAINE HYDROCHLORIDE 40 MG: 10 INJECTION, SOLUTION EPIDURAL; INFILTRATION; INTRACAUDAL; PERINEURAL at 09:28:00

## 2021-02-05 RX ADMIN — ONDANSETRON 4 MG: 2 INJECTION INTRAMUSCULAR; INTRAVENOUS at 10:36:00

## 2021-02-05 RX ADMIN — GLYCOPYRROLATE 0.6 MG: 0.2 INJECTION, SOLUTION INTRAMUSCULAR; INTRAVENOUS at 10:42:00

## 2021-02-05 RX ADMIN — MIDAZOLAM HYDROCHLORIDE 2 MG: 1 INJECTION INTRAMUSCULAR; INTRAVENOUS at 09:24:00

## 2021-02-05 RX ADMIN — SODIUM CHLORIDE, SODIUM LACTATE, POTASSIUM CHLORIDE, CALCIUM CHLORIDE: 600; 310; 30; 20 INJECTION, SOLUTION INTRAVENOUS at 11:00:00

## 2021-02-05 RX ADMIN — ROCURONIUM BROMIDE 40 MG: 10 INJECTION, SOLUTION INTRAVENOUS at 09:29:00

## 2021-02-05 RX ADMIN — NEOSTIGMINE METHYLSULFATE 3 MG: 1 INJECTION INTRAVENOUS at 10:42:00

## 2021-02-05 RX ADMIN — KETOROLAC TROMETHAMINE 30 MG: 30 INJECTION, SOLUTION INTRAMUSCULAR; INTRAVENOUS at 10:39:00

## 2021-02-05 NOTE — ANESTHESIA PREPROCEDURE EVALUATION
Anesthesia PreOp Note    HPI:     Tereso Schuler is a 26year old female who presents for preoperative consultation requested by: Saulo Fuller MD    Date of Surgery: 2/5/2021    Procedure(s):  LAPAROSCOPIC OVARIAN CYSTECTOMY  Indicatio 1 Package, Rfl: 11, 2/4/2021 at Unknown time    •  B Complex-C-Folic Acid (HM VITAMIN B COMPLEX/VITAMIN C) Oral Tab, Take by mouth., Disp: , Rfl: , Past Week at Unknown time    •  Zinc 25 MG Oral Tab, Take by mouth., Disp: , Rfl: , Past Week at Unknown bird Not on file      Stress: Not on file    Relationships      Social connections        Talks on phone: Not on file        Gets together: Not on file        Attends Pentecostalism service: Not on file        Active member of club or organization: Not on file Patient  Use of Blood Products Discussed With:  Patient      I have informed Daniela Sue and/or legal guardian or family member of the nature of the anesthetic plan, benefits, risks including possible dental damage if relevant, major complications

## 2021-02-05 NOTE — BRIEF OP NOTE
Pre-Operative Diagnosis: pelvic pain     Post-Operative Diagnosis: * No post-op diagnosis entered *      Procedure Performed:   Procedure(s):  laparoscopic left ovarian cystectomy    Surgeon(s) and Role:     * Rose Butcher MD - Primary    As

## 2021-02-05 NOTE — ANESTHESIA POSTPROCEDURE EVALUATION
Patient: Jacklyn Aguilar    Procedure Summary     Date: 02/05/21 Room / Location: [address] [place] [address] MAIN OR 02 / [address] [facility] MAIN OR    Anesthesia Start: 1274 Anesthesia Stop: 1100    Procedure: LAPAROSCOPIC OVARIAN CYSTECTOMY (Left Abdomen) Diagnosis: (pelvic pain)    Deyvi

## 2021-02-05 NOTE — H&P
GYN H&P     2021  6:31 PM    CC: Patient is here for left ovarian cystectomy    HPI: Patient is a 26year old  with persistent pelvic pain for over 2 weeks and large L ovarian cyst presents for ovarian cystectomy        Patient's last menstrual nourished, in no apparent distress  SKIN: no rashes, no suspicious lesions  HEENT: normal  NECK: supple; no thyroidmegaly, no adenopathy  BREASTS: symmetrical, nontender, no palpable masses or nodes, no nipple discharge, no skin changes, no dippling, no pa 8-12 weeks . CUL-DE-SAC:   Normal.  No free fluid or mass. OTHER:             Negative. Bladder appears normal.                =====  CONCLUSION:                1. Normal uterine sonogram.  Normal thickness endometrium  2.   Small amount of blood/se

## 2021-02-05 NOTE — INTERVAL H&P NOTE
Pre-op Diagnosis: pelvic pain    The above referenced H&P was reviewed by Kamari Garcia MD on 2/5/2021, the patient was examined and no significant changes have occurred in the patient's condition since the H&P was performed.   I discussed with

## 2021-02-05 NOTE — ANESTHESIA PROCEDURE NOTES
Airway  Date/Time: 2/5/2021 9:30 AM  Urgency: Elective    Airway not difficult    General Information and Staff    Patient location during procedure: OR  Anesthesiologist: Marjan Zamora MD  Resident/CRNA: Ashkan Damico CRNA  Performed: Anastasiia Mabry

## 2021-02-06 NOTE — OPERATIVE REPORT
[facility]    PATIENT'S NAME: Marivelmaria l Quinteros CHIDI   ATTENDING PHYSICIAN: Sherry Stacy MD   OPERATING PHYSICIAN: Sherry Stacy MD   PATIENT ACCOUNT#:   [id number]    LOCATION:  [address]  MEDICAL RECORD #:   [id number] under direct visualization. Marcaine was injected prior to making the incisions. Upon elevating the uterus and with simple manipulation, the cyst wall easily ruptured. An incision was made in the cyst wall via the use of a laparoscopic hook. The edge of t

## 2021-02-23 ENCOUNTER — OFFICE VISIT (OUTPATIENT)
Dept: OBGYN CLINIC | Facility: CLINIC | Age: 26
End: 2021-02-23
Payer: COMMERCIAL

## 2021-02-23 VITALS — BODY MASS INDEX: 20 KG/M2 | DIASTOLIC BLOOD PRESSURE: 68 MMHG | HEIGHT: 64 IN | SYSTOLIC BLOOD PRESSURE: 104 MMHG

## 2021-02-23 DIAGNOSIS — Z09 POSTOP CHECK: Primary | ICD-10-CM

## 2021-02-23 PROCEDURE — 3078F DIAST BP <80 MM HG: CPT | Performed by: OBSTETRICS & GYNECOLOGY

## 2021-02-23 PROCEDURE — 99024 POSTOP FOLLOW-UP VISIT: CPT | Performed by: OBSTETRICS & GYNECOLOGY

## 2021-02-23 PROCEDURE — 3074F SYST BP LT 130 MM HG: CPT | Performed by: OBSTETRICS & GYNECOLOGY

## 2021-02-24 NOTE — PROGRESS NOTES
CC: Patient is here for postop visit. HPI: Patient is a 26year old  for postop visit after L ovarian cystectomy No complaints. Patient's last menstrual period was 2021 (exact date).     OB History    Para Term  AB Living on file        Minutes per session: Not on file      Stress: Not on file    Relationships      Social connections        Talks on phone: Not on file        Gets together: Not on file        Attends Buddhism service: Not on file        Active member of clu

## 2021-08-13 ENCOUNTER — HOSPITAL ENCOUNTER (OUTPATIENT)
Age: 26
Discharge: HOME | End: 2021-08-13
Payer: COMMERCIAL

## 2021-08-13 ENCOUNTER — PATIENT MESSAGE (OUTPATIENT)
Dept: OBGYN CLINIC | Facility: CLINIC | Age: 26
End: 2021-08-13

## 2021-08-13 VITALS
SYSTOLIC BLOOD PRESSURE: 128 MMHG | HEART RATE: 94 BPM | TEMPERATURE: 98 F | OXYGEN SATURATION: 100 % | RESPIRATION RATE: 18 BRPM | DIASTOLIC BLOOD PRESSURE: 72 MMHG

## 2021-08-13 DIAGNOSIS — N30.00 ACUTE CYSTITIS WITHOUT HEMATURIA: ICD-10-CM

## 2021-08-13 DIAGNOSIS — N89.8 VAGINAL DISCHARGE: Primary | ICD-10-CM

## 2021-08-13 DIAGNOSIS — B37.9 YEAST INFECTION: ICD-10-CM

## 2021-08-13 LAB
#MXD IC: 0.5 X10ˆ3/UL (ref 0.1–1)
BUN BLD-MCNC: 8 MG/DL (ref 7–18)
CHLORIDE BLD-SCNC: 100 MMOL/L (ref 98–112)
CLARITY UR: CLEAR
CO2 BLD-SCNC: 30 MMOL/L (ref 21–32)
CREAT BLD-MCNC: 0.7 MG/DL
GLUCOSE BLD-MCNC: 93 MG/DL (ref 70–99)
GLUCOSE UR STRIP-MCNC: 250 MG/DL
HCT VFR BLD AUTO: 44.9 %
HCT VFR BLD CALC: 49 %
HGB BLD-MCNC: 14.7 G/DL
HGB UR QL STRIP: NEGATIVE
ISTAT IONIZED CALCIUM FOR CHEM 8: 1.25 MMOL/L (ref 1.12–1.32)
KETONES UR STRIP-MCNC: 15 MG/DL
LYMPHOCYTES # BLD AUTO: 1.9 X10ˆ3/UL (ref 1–4)
LYMPHOCYTES NFR BLD AUTO: 40.6 %
MCH RBC QN AUTO: 31.3 PG (ref 26–34)
MCHC RBC AUTO-ENTMCNC: 32.7 G/DL (ref 31–37)
MCV RBC AUTO: 95.5 FL (ref 80–100)
MIXED CELL %: 11.6 %
NEUTROPHILS # BLD AUTO: 2.3 X10ˆ3/UL (ref 1.5–7.7)
NEUTROPHILS NFR BLD AUTO: 47.8 %
NITRITE UR QL STRIP: POSITIVE
PH UR STRIP: 6.5 [PH]
PLATELET # BLD AUTO: 211 X10ˆ3/UL (ref 150–450)
POTASSIUM BLD-SCNC: 4.6 MMOL/L (ref 3.6–5.1)
PROT UR STRIP-MCNC: 30 MG/DL
RBC # BLD AUTO: 4.7 X10ˆ6/UL
SODIUM BLD-SCNC: 140 MMOL/L (ref 136–145)
SP GR UR STRIP: 1.01
UROBILINOGEN UR STRIP-ACNC: 2 MG/DL
WBC # BLD AUTO: 4.7 X10ˆ3/UL (ref 4–11)

## 2021-08-13 PROCEDURE — 87205 SMEAR GRAM STAIN: CPT | Performed by: EMERGENCY MEDICINE

## 2021-08-13 PROCEDURE — 81002 URINALYSIS NONAUTO W/O SCOPE: CPT | Performed by: EMERGENCY MEDICINE

## 2021-08-13 PROCEDURE — 99203 OFFICE O/P NEW LOW 30 MIN: CPT | Performed by: EMERGENCY MEDICINE

## 2021-08-13 PROCEDURE — 85025 COMPLETE CBC W/AUTO DIFF WBC: CPT | Performed by: EMERGENCY MEDICINE

## 2021-08-13 PROCEDURE — 87808 TRICHOMONAS ASSAY W/OPTIC: CPT | Performed by: EMERGENCY MEDICINE

## 2021-08-13 PROCEDURE — 80047 BASIC METABLC PNL IONIZED CA: CPT | Performed by: EMERGENCY MEDICINE

## 2021-08-13 PROCEDURE — 87106 FUNGI IDENTIFICATION YEAST: CPT | Performed by: EMERGENCY MEDICINE

## 2021-08-13 PROCEDURE — 36415 COLL VENOUS BLD VENIPUNCTURE: CPT | Performed by: EMERGENCY MEDICINE

## 2021-08-13 RX ORDER — SULFAMETHOXAZOLE AND TRIMETHOPRIM 800; 160 MG/1; MG/1
1 TABLET ORAL 2 TIMES DAILY
Qty: 14 TABLET | Refills: 0 | Status: SHIPPED | OUTPATIENT
Start: 2021-08-13 | End: 2021-08-20

## 2021-08-13 NOTE — ED PROVIDER NOTES
Patient Seen in: [facility]      History   Patient presents with:  Urinary Symptoms: Entered by patient    Stated Complaint: Urinary Symptoms    HPI/Subjective:   Julia Modi is a 26year old female here for urinary frequency, and t round, and reactive to light. Pulmonary:      Effort: Pulmonary effort is normal. No respiratory distress. Abdominal:      General: Abdomen is flat. Palpations: Abdomen is soft. Tenderness:  There is no right CVA tenderness or left CVA tendern abx.   u-preg negative here. CBC: White blood cell within normal limits of 4.7. Normal neutrophils, lymphocytes. Bmp: Renal function within normal limits: creatinine 0.70, , and BUN 8. Pt is not actively trying to get pregnant.    We discusse

## 2021-08-13 NOTE — ED INITIAL ASSESSMENT (HPI)
Pt came in due to urinary frequency and possible yeast infection. Pt denies any pain. Pt has easy non labored respirations. Pt is fully verbal and ambulatory.

## 2021-08-16 LAB
GENITAL VAGINOSIS SCREEN: NEGATIVE
TRICHOMONAS SCREEN: NEGATIVE

## 2021-08-16 NOTE — TELEPHONE ENCOUNTER
From: Laura Lynne  Sent: 8/13/2021 4:34 PM CDT  To: [facility] Clinical Staff  Subject: RE: Non-Urgent Medical Question    Is there a way to get in line or do I just walk in?    ----- Message -----  From: Marcelo Locke: 8/13/21, 4:09 PM  To:  Rochelle

## 2021-12-06 ENCOUNTER — OFFICE VISIT (OUTPATIENT)
Dept: OBGYN CLINIC | Facility: CLINIC | Age: 26
End: 2021-12-06
Payer: COMMERCIAL

## 2021-12-06 VITALS
SYSTOLIC BLOOD PRESSURE: 114 MMHG | WEIGHT: 110 LBS | BODY MASS INDEX: 18.78 KG/M2 | DIASTOLIC BLOOD PRESSURE: 80 MMHG | HEIGHT: 64 IN

## 2021-12-06 DIAGNOSIS — D06.9 CIN III (CERVICAL INTRAEPITHELIAL NEOPLASIA GRADE III) WITH SEVERE DYSPLASIA: ICD-10-CM

## 2021-12-06 DIAGNOSIS — Z01.419 ENCOUNTER FOR GYNECOLOGICAL EXAMINATION WITHOUT ABNORMAL FINDING: Primary | ICD-10-CM

## 2021-12-06 PROCEDURE — 3008F BODY MASS INDEX DOCD: CPT | Performed by: OBSTETRICS & GYNECOLOGY

## 2021-12-06 PROCEDURE — 99395 PREV VISIT EST AGE 18-39: CPT | Performed by: OBSTETRICS & GYNECOLOGY

## 2021-12-06 PROCEDURE — 90686 IIV4 VACC NO PRSV 0.5 ML IM: CPT | Performed by: OBSTETRICS & GYNECOLOGY

## 2021-12-06 PROCEDURE — 3074F SYST BP LT 130 MM HG: CPT | Performed by: OBSTETRICS & GYNECOLOGY

## 2021-12-06 PROCEDURE — 87624 HPV HI-RISK TYP POOLED RSLT: CPT | Performed by: OBSTETRICS & GYNECOLOGY

## 2021-12-06 PROCEDURE — 3079F DIAST BP 80-89 MM HG: CPT | Performed by: OBSTETRICS & GYNECOLOGY

## 2021-12-06 PROCEDURE — 90471 IMMUNIZATION ADMIN: CPT | Performed by: OBSTETRICS & GYNECOLOGY

## 2021-12-06 NOTE — PROGRESS NOTES
GYN H&P     2021  11:27 AM    CC: Patient is here for annual.     HPI: Patient is a 23year old  for annual. She had a  LEEP with rick III with + margine.  2020 pap was normal.     Menses: stopped OCPs / not sexually active and are i Disposable, Pre-filled or refillable cartridge, Refillable tank, Pre-filled pod    Substance and Sexual Activity      Alcohol use:  Yes        Alcohol/week: 0.0 standard drinks      Drug use: Yes        Types: Cannabis      Sexual activity: Yes        Partn

## 2022-09-14 ENCOUNTER — OFFICE VISIT (OUTPATIENT)
Dept: OBGYN CLINIC | Facility: CLINIC | Age: 27
End: 2022-09-14
Payer: COMMERCIAL

## 2022-09-14 VITALS
BODY MASS INDEX: 18.95 KG/M2 | HEIGHT: 64 IN | SYSTOLIC BLOOD PRESSURE: 100 MMHG | WEIGHT: 111 LBS | DIASTOLIC BLOOD PRESSURE: 66 MMHG

## 2022-09-14 DIAGNOSIS — Z01.419 ENCOUNTER FOR GYNECOLOGICAL EXAMINATION WITHOUT ABNORMAL FINDING: ICD-10-CM

## 2022-09-14 DIAGNOSIS — D06.9 CIN III (CERVICAL INTRAEPITHELIAL NEOPLASIA GRADE III) WITH SEVERE DYSPLASIA: ICD-10-CM

## 2022-09-14 DIAGNOSIS — R10.32 LLQ PAIN: Primary | ICD-10-CM

## 2022-09-14 LAB
CYTOLOGY CVX/VAG DOC THIN PREP: NORMAL
HPV16+18+45 E6+E7MRNA CVX NAA+PROBE: NEGATIVE

## 2022-09-14 PROCEDURE — 99395 PREV VISIT EST AGE 18-39: CPT | Performed by: OBSTETRICS & GYNECOLOGY

## 2022-09-14 PROCEDURE — 3008F BODY MASS INDEX DOCD: CPT | Performed by: OBSTETRICS & GYNECOLOGY

## 2022-09-14 PROCEDURE — 87624 HPV HI-RISK TYP POOLED RSLT: CPT | Performed by: OBSTETRICS & GYNECOLOGY

## 2022-09-14 PROCEDURE — 3074F SYST BP LT 130 MM HG: CPT | Performed by: OBSTETRICS & GYNECOLOGY

## 2022-09-14 PROCEDURE — 3078F DIAST BP <80 MM HG: CPT | Performed by: OBSTETRICS & GYNECOLOGY

## 2022-09-15 LAB — HPV I/H RISK 1 DNA SPEC QL NAA+PROBE: NEGATIVE

## 2022-11-18 ENCOUNTER — TELEPHONE (OUTPATIENT)
Dept: OBGYN CLINIC | Facility: CLINIC | Age: 27
End: 2022-11-18

## 2022-11-18 NOTE — TELEPHONE ENCOUNTER
The patient called asking for a refill on :  Tri Sprintec  28S    I don't see it in the chart but she stated Dr. Seema Breen did prescribe it in April of 2022.

## 2022-11-18 NOTE — TELEPHONE ENCOUNTER
RN spoke with pt. Pt started taking spironolactone for acne earlier this month, and she feels like it is increasing the length of her period, so she would like to restart OCPs. LC last prescribed OCPs in 1/2021, so RN told pt that RN would have to check with [address] [place] [address] before restarting. (Pt was last seen in the office in 9/2022 and was no longer taking OCPs at that time, so a new rx was not sent.) RN told pt that RN would route msg to [facility], but that [facility] is out of the office until 11/21. Rn verified pt's preferred pharmacy. Pt verbalized understanding and agreed with POC.

## 2022-11-22 RX ORDER — NORGESTIMATE AND ETHINYL ESTRADIOL 7DAYSX3 LO
1 KIT ORAL DAILY
Qty: 84 TABLET | Refills: 3 | Status: SHIPPED | OUTPATIENT
Start: 2022-11-22 | End: 2023-11-22

## 2022-11-22 NOTE — TELEPHONE ENCOUNTER
MD Ramses Li, RN 18 minutes ago (3:12 PM)     Yes, you may give her a refill for 1 year. Omar Hussein, RN  Vanesa Sears MD 4 days ago     KR  Pt would like to restart OCPs for cycle control. Rx for Ortho Tri-Cyclen Lo was last sent in 1/2021. Please advise. Thanks! Order placed and called pt, LVM.

## 2023-02-27 ENCOUNTER — OFFICE VISIT (OUTPATIENT)
Dept: INTERNAL MEDICINE | Age: 28
End: 2023-02-27

## 2023-02-27 ENCOUNTER — LAB SERVICES (OUTPATIENT)
Dept: LAB | Age: 28
End: 2023-02-27

## 2023-02-27 VITALS
TEMPERATURE: 97.6 F | HEIGHT: 64 IN | BODY MASS INDEX: 20.04 KG/M2 | HEART RATE: 84 BPM | OXYGEN SATURATION: 100 % | DIASTOLIC BLOOD PRESSURE: 87 MMHG | WEIGHT: 117.4 LBS | SYSTOLIC BLOOD PRESSURE: 116 MMHG

## 2023-02-27 DIAGNOSIS — Z00.00 ROUTINE ADULT HEALTH MAINTENANCE: ICD-10-CM

## 2023-02-27 DIAGNOSIS — F90.0 ATTENTION DEFICIT HYPERACTIVITY DISORDER, PREDOMINANTLY INATTENTIVE TYPE: Primary | ICD-10-CM

## 2023-02-27 DIAGNOSIS — K90.41 GLUTEN INTOLERANCE: ICD-10-CM

## 2023-02-27 DIAGNOSIS — F41.9 ANXIETY: ICD-10-CM

## 2023-02-27 PROBLEM — N83.209 OVARIAN CYST: Status: ACTIVE | Noted: 2023-02-27

## 2023-02-27 PROCEDURE — 99385 PREV VISIT NEW AGE 18-39: CPT | Performed by: INTERNAL MEDICINE

## 2023-02-27 PROCEDURE — 82784 ASSAY IGA/IGD/IGG/IGM EACH: CPT | Performed by: INTERNAL MEDICINE

## 2023-02-27 PROCEDURE — 85025 COMPLETE CBC W/AUTO DIFF WBC: CPT | Performed by: INTERNAL MEDICINE

## 2023-02-27 PROCEDURE — 86364 TISS TRNSGLTMNASE EA IG CLAS: CPT | Performed by: INTERNAL MEDICINE

## 2023-02-27 PROCEDURE — 36415 COLL VENOUS BLD VENIPUNCTURE: CPT | Performed by: INTERNAL MEDICINE

## 2023-02-27 PROCEDURE — 84443 ASSAY THYROID STIM HORMONE: CPT | Performed by: INTERNAL MEDICINE

## 2023-02-27 RX ORDER — DEXTROAMPHETAMINE SACCHARATE, AMPHETAMINE ASPARTATE, DEXTROAMPHETAMINE SULFATE AND AMPHETAMINE SULFATE 5; 5; 5; 5 MG/1; MG/1; MG/1; MG/1
TABLET ORAL
COMMUNITY

## 2023-02-27 ASSESSMENT — PAIN SCALES - GENERAL: PAINLEVEL: 0

## 2023-02-27 ASSESSMENT — ANXIETY QUESTIONNAIRES
6. BECOMING EASILY ANNOYED OR IRRITABLE: SEVERAL DAYS
3. WORRYING TOO MUCH ABOUT DIFFERENT THINGS: 1
7. FEELING AFRAID AS IF SOMETHING AWFUL MIGHT HAPPEN: 0
2. NOT BEING ABLE TO STOP OR CONTROL WORRYING: 1
1. FEELING NERVOUS, ANXIOUS, OR ON EDGE: SEVERAL DAYS
5. BEING SO RESTLESS THAT IT IS HARD TO SIT STILL: 0
4. TROUBLE RELAXING: 1
1. FEELING NERVOUS, ANXIOUS, OR ON EDGE: 1
4. TROUBLE RELAXING: SEVERAL DAYS
GAD7 TOTAL SCORE: 5
2. NOT BEING ABLE TO STOP OR CONTROL WORRYING: SEVERAL DAYS
6. BECOMING EASILY ANNOYED OR IRRITABLE: 1
7. FEELING AFRAID AS IF SOMETHING AWFUL MIGHT HAPPEN: NOT AT ALL
3. WORRYING TOO MUCH ABOUT DIFFERENT THINGS: SEVERAL DAYS
5. BEING SO RESTLESS THAT IT IS HARD TO SIT STILL: NOT AT ALL

## 2023-02-27 ASSESSMENT — PATIENT HEALTH QUESTIONNAIRE - PHQ9
CLINICAL INTERPRETATION OF PHQ2 SCORE: NO FURTHER SCREENING NEEDED
2. FEELING DOWN, DEPRESSED OR HOPELESS: NOT AT ALL
SUM OF ALL RESPONSES TO PHQ9 QUESTIONS 1 AND 2: 0
SUM OF ALL RESPONSES TO PHQ9 QUESTIONS 1 AND 2: 0
CLINICAL INTERPRETATION OF PHQ2 SCORE: NO FURTHER SCREENING NEEDED
SUM OF ALL RESPONSES TO PHQ9 QUESTIONS 1 AND 2: 0
1. LITTLE INTEREST OR PLEASURE IN DOING THINGS: NOT AT ALL
SUM OF ALL RESPONSES TO PHQ9 QUESTIONS 1 AND 2: 0
2. FEELING DOWN, DEPRESSED OR HOPELESS: NOT AT ALL
1. LITTLE INTEREST OR PLEASURE IN DOING THINGS: NOT AT ALL

## 2023-02-28 LAB
BASOPHILS # BLD: 0.1 K/MCL (ref 0–0.3)
BASOPHILS NFR BLD: 1 %
DEPRECATED RDW RBC: 42.5 FL (ref 39–50)
EOSINOPHIL # BLD: 0.1 K/MCL (ref 0–0.5)
EOSINOPHIL NFR BLD: 2 %
ERYTHROCYTE [DISTWIDTH] IN BLOOD: 12.1 % (ref 11–15)
HCT VFR BLD CALC: 44.7 % (ref 36–46.5)
HGB BLD-MCNC: 14.7 G/DL (ref 12–15.5)
IMM GRANULOCYTES # BLD AUTO: 0 K/MCL (ref 0–0.2)
IMM GRANULOCYTES # BLD: 0 %
LYMPHOCYTES # BLD: 2.1 K/MCL (ref 1–4.8)
LYMPHOCYTES NFR BLD: 38 %
MCH RBC QN AUTO: 31.6 PG (ref 26–34)
MCHC RBC AUTO-ENTMCNC: 32.9 G/DL (ref 32–36.5)
MCV RBC AUTO: 96.1 FL (ref 78–100)
MONOCYTES # BLD: 0.6 K/MCL (ref 0.3–0.9)
MONOCYTES NFR BLD: 10 %
NEUTROPHILS # BLD: 2.7 K/MCL (ref 1.8–7.7)
NEUTROPHILS NFR BLD: 49 %
NRBC BLD MANUAL-RTO: 0 /100 WBC
PLATELET # BLD AUTO: 245 K/MCL (ref 140–450)
RBC # BLD: 4.65 MIL/MCL (ref 4–5.2)
TSH SERPL-ACNC: 0.93 MCUNITS/ML (ref 0.35–5)
WBC # BLD: 5.5 K/MCL (ref 4.2–11)

## 2023-03-01 LAB
IGA SERPL-MCNC: 156 MG/DL (ref 70–400)
TTG IGA SER IA-ACNC: 5 UNITS

## 2023-06-13 ENCOUNTER — CLINICAL ABSTRACT (OUTPATIENT)
Dept: FAMILY MEDICINE | Age: 28
End: 2023-06-13

## 2023-07-17 ENCOUNTER — HOSPITAL ENCOUNTER (OUTPATIENT)
Age: 28
Discharge: HOME | End: 2023-07-17
Payer: COMMERCIAL

## 2023-07-17 VITALS
DIASTOLIC BLOOD PRESSURE: 76 MMHG | HEART RATE: 69 BPM | TEMPERATURE: 97 F | RESPIRATION RATE: 20 BRPM | SYSTOLIC BLOOD PRESSURE: 118 MMHG | OXYGEN SATURATION: 100 %

## 2023-07-17 DIAGNOSIS — O26.891 VAGINAL DISCHARGE DURING PREGNANCY IN FIRST TRIMESTER: Primary | ICD-10-CM

## 2023-07-17 DIAGNOSIS — Z34.91 FIRST TRIMESTER PREGNANCY: ICD-10-CM

## 2023-07-17 DIAGNOSIS — N89.8 VAGINAL ITCHING: ICD-10-CM

## 2023-07-17 DIAGNOSIS — N89.8 VAGINAL DISCHARGE DURING PREGNANCY IN FIRST TRIMESTER: Primary | ICD-10-CM

## 2023-07-17 LAB
B-HCG UR QL: POSITIVE
BILIRUB UR QL STRIP: NEGATIVE
COLOR UR: YELLOW
GLUCOSE UR STRIP-MCNC: NEGATIVE MG/DL
HGB UR QL STRIP: NEGATIVE
KETONES UR STRIP-MCNC: NEGATIVE MG/DL
NITRITE UR QL STRIP: NEGATIVE
PH UR STRIP: 7.5 [PH]
PROT UR STRIP-MCNC: NEGATIVE MG/DL
SP GR UR STRIP: 1.02
UROBILINOGEN UR STRIP-ACNC: <2 MG/DL

## 2023-07-17 PROCEDURE — 81025 URINE PREGNANCY TEST: CPT | Performed by: NURSE PRACTITIONER

## 2023-07-17 PROCEDURE — 99213 OFFICE O/P EST LOW 20 MIN: CPT | Performed by: NURSE PRACTITIONER

## 2023-07-17 PROCEDURE — 81002 URINALYSIS NONAUTO W/O SCOPE: CPT | Performed by: NURSE PRACTITIONER

## 2023-07-17 RX ORDER — CLOTRIMAZOLE 1 %
1 CREAM WITH APPLICATOR VAGINAL NIGHTLY
Qty: 45 G | Refills: 0 | Status: SHIPPED | OUTPATIENT
Start: 2023-07-17 | End: 2023-07-24

## 2023-07-17 NOTE — ED INITIAL ASSESSMENT (HPI)
Pt here with concerns of a possible Yeast infection , pt states she has been having vaginal itching and discharge for 2 days, pt denies any ab pain or fever

## 2023-07-18 LAB
C TRACH DNA SPEC QL NAA+PROBE: NEGATIVE
N GONORRHOEA DNA SPEC QL NAA+PROBE: NEGATIVE

## 2023-07-19 LAB
GENITAL VAGINOSIS SCREEN: NEGATIVE
TRICHOMONAS SCREEN: NEGATIVE

## 2023-08-02 ENCOUNTER — LAB ENCOUNTER (OUTPATIENT)
Dept: LAB | Facility: REFERENCE LAB | Age: 28
End: 2023-08-02
Attending: Other
Payer: COMMERCIAL

## 2023-08-02 ENCOUNTER — OFFICE VISIT (OUTPATIENT)
Dept: FAMILY MEDICINE CLINIC | Facility: CLINIC | Age: 28
End: 2023-08-02

## 2023-08-02 VITALS
WEIGHT: 127 LBS | DIASTOLIC BLOOD PRESSURE: 70 MMHG | SYSTOLIC BLOOD PRESSURE: 100 MMHG | HEIGHT: 64 IN | BODY MASS INDEX: 21.68 KG/M2 | HEART RATE: 92 BPM | TEMPERATURE: 98 F | OXYGEN SATURATION: 98 %

## 2023-08-02 DIAGNOSIS — F90.0 ATTENTION DEFICIT HYPERACTIVITY DISORDER, PREDOMINANTLY INATTENTIVE TYPE: ICD-10-CM

## 2023-08-02 DIAGNOSIS — Z00.00 ENCOUNTER FOR ANNUAL PHYSICAL EXAM: ICD-10-CM

## 2023-08-02 DIAGNOSIS — Z87.59 HISTORY OF MISCARRIAGE: ICD-10-CM

## 2023-08-02 DIAGNOSIS — Z76.89 ENCOUNTER TO ESTABLISH CARE: Primary | ICD-10-CM

## 2023-08-02 DIAGNOSIS — F41.1 GENERALIZED ANXIETY DISORDER: ICD-10-CM

## 2023-08-02 PROBLEM — R68.82 LOW LIBIDO: Status: RESOLVED | Noted: 2018-12-03 | Resolved: 2023-08-02

## 2023-08-02 PROBLEM — L70.0 ACNE VULGARIS: Status: RESOLVED | Noted: 2018-12-03 | Resolved: 2023-08-02

## 2023-08-02 PROBLEM — F41.9 ANXIETY: Status: ACTIVE | Noted: 2023-02-27

## 2023-08-02 PROBLEM — K90.41 GLUTEN INTOLERANCE: Status: RESOLVED | Noted: 2023-02-27 | Resolved: 2023-08-02

## 2023-08-02 PROBLEM — K90.41 GLUTEN INTOLERANCE: Status: ACTIVE | Noted: 2023-02-27

## 2023-08-02 PROBLEM — R10.2 PELVIC PAIN: Status: RESOLVED | Noted: 2021-02-05 | Resolved: 2023-08-02

## 2023-08-02 LAB
ALBUMIN SERPL-MCNC: 4 G/DL (ref 3.4–5)
ALBUMIN/GLOB SERPL: 1.3 {RATIO} (ref 1–2)
ALP LIVER SERPL-CCNC: 61 U/L
ALT SERPL-CCNC: 22 U/L
ANION GAP SERPL CALC-SCNC: 6 MMOL/L (ref 0–18)
AST SERPL-CCNC: 16 U/L (ref 15–37)
BASOPHILS # BLD AUTO: 0.05 X10(3) UL (ref 0–0.2)
BASOPHILS NFR BLD AUTO: 0.9 %
BILIRUB SERPL-MCNC: 0.7 MG/DL (ref 0.1–2)
BUN BLD-MCNC: 9 MG/DL (ref 7–18)
BUN/CREAT SERPL: 11.4 (ref 10–20)
CALCIUM BLD-MCNC: 8.9 MG/DL (ref 8.5–10.1)
CHLORIDE SERPL-SCNC: 108 MMOL/L (ref 98–112)
CHOLEST SERPL-MCNC: 168 MG/DL (ref ?–200)
CO2 SERPL-SCNC: 26 MMOL/L (ref 21–32)
CREAT BLD-MCNC: 0.79 MG/DL
DEPRECATED RDW RBC AUTO: 44.3 FL (ref 35.1–46.3)
EGFRCR SERPLBLD CKD-EPI 2021: 106 ML/MIN/1.73M2 (ref 60–?)
EOSINOPHIL # BLD AUTO: 0.08 X10(3) UL (ref 0–0.7)
EOSINOPHIL NFR BLD AUTO: 1.4 %
ERYTHROCYTE [DISTWIDTH] IN BLOOD BY AUTOMATED COUNT: 12.6 % (ref 11–15)
FASTING PATIENT LIPID ANSWER: NO
FASTING STATUS PATIENT QL REPORTED: NO
GLOBULIN PLAS-MCNC: 3 G/DL (ref 2.8–4.4)
GLUCOSE BLD-MCNC: 88 MG/DL (ref 70–99)
HCT VFR BLD AUTO: 39.4 %
HDLC SERPL-MCNC: 78 MG/DL (ref 40–59)
HGB BLD-MCNC: 13.3 G/DL
IMM GRANULOCYTES # BLD AUTO: 0.02 X10(3) UL (ref 0–1)
IMM GRANULOCYTES NFR BLD: 0.4 %
LDLC SERPL CALC-MCNC: 76 MG/DL (ref ?–100)
LYMPHOCYTES # BLD AUTO: 1.5 X10(3) UL (ref 1–4)
LYMPHOCYTES NFR BLD AUTO: 26.5 %
MCH RBC QN AUTO: 32 PG (ref 26–34)
MCHC RBC AUTO-ENTMCNC: 33.8 G/DL (ref 31–37)
MCV RBC AUTO: 94.7 FL
MONOCYTES # BLD AUTO: 0.43 X10(3) UL (ref 0.1–1)
MONOCYTES NFR BLD AUTO: 7.6 %
NEUTROPHILS # BLD AUTO: 3.59 X10 (3) UL (ref 1.5–7.7)
NEUTROPHILS # BLD AUTO: 3.59 X10(3) UL (ref 1.5–7.7)
NEUTROPHILS NFR BLD AUTO: 63.2 %
NONHDLC SERPL-MCNC: 90 MG/DL (ref ?–130)
OSMOLALITY SERPL CALC.SUM OF ELEC: 288 MOSM/KG (ref 275–295)
PLATELET # BLD AUTO: 246 10(3)UL (ref 150–450)
POTASSIUM SERPL-SCNC: 4.2 MMOL/L (ref 3.5–5.1)
PROT SERPL-MCNC: 7 G/DL (ref 6.4–8.2)
RBC # BLD AUTO: 4.16 X10(6)UL
SODIUM SERPL-SCNC: 140 MMOL/L (ref 136–145)
TRIGL SERPL-MCNC: 71 MG/DL (ref 30–149)
VLDLC SERPL CALC-MCNC: 11 MG/DL (ref 0–30)
WBC # BLD AUTO: 5.7 X10(3) UL (ref 4–11)

## 2023-08-02 PROCEDURE — 80053 COMPREHEN METABOLIC PANEL: CPT

## 2023-08-02 PROCEDURE — 80061 LIPID PANEL: CPT

## 2023-08-02 PROCEDURE — 99385 PREV VISIT NEW AGE 18-39: CPT

## 2023-08-02 PROCEDURE — 36415 COLL VENOUS BLD VENIPUNCTURE: CPT

## 2023-08-02 PROCEDURE — 85025 COMPLETE CBC W/AUTO DIFF WBC: CPT

## 2023-08-02 PROCEDURE — 3078F DIAST BP <80 MM HG: CPT

## 2023-08-02 PROCEDURE — 3008F BODY MASS INDEX DOCD: CPT

## 2023-08-02 PROCEDURE — 3074F SYST BP LT 130 MM HG: CPT

## 2023-08-02 RX ORDER — DEXTROAMPHETAMINE SACCHARATE, AMPHETAMINE ASPARTATE, DEXTROAMPHETAMINE SULFATE AND AMPHETAMINE SULFATE 5; 5; 5; 5 MG/1; MG/1; MG/1; MG/1
20 TABLET ORAL DAILY
Qty: 30 TABLET | Refills: 0 | Status: SHIPPED | OUTPATIENT
Start: 2023-08-02

## 2023-08-31 ENCOUNTER — TELEPHONE (OUTPATIENT)
Dept: OBGYN CLINIC | Facility: CLINIC | Age: 28
End: 2023-08-31

## 2023-09-02 DIAGNOSIS — F90.0 ATTENTION DEFICIT HYPERACTIVITY DISORDER, PREDOMINANTLY INATTENTIVE TYPE: ICD-10-CM

## 2023-09-03 NOTE — TELEPHONE ENCOUNTER
Please review. Protocol failed / No Protocol. Requested Prescriptions   Pending Prescriptions Disp Refills    amphetamine-dextroamphetamine 20 MG Oral Tab 30 tablet 0     Sig: Take 1 tablet (20 mg total) by mouth daily.        There is no refill protocol information for this order

## 2023-09-05 RX ORDER — DEXTROAMPHETAMINE SACCHARATE, AMPHETAMINE ASPARTATE, DEXTROAMPHETAMINE SULFATE AND AMPHETAMINE SULFATE 5; 5; 5; 5 MG/1; MG/1; MG/1; MG/1
20 TABLET ORAL DAILY
Qty: 30 TABLET | Refills: 0 | Status: SHIPPED | OUTPATIENT
Start: 2023-09-05

## 2023-09-11 LAB — AMB EXT COVID-19 RESULT: DETECTED

## 2023-09-11 NOTE — PROGRESS NOTES
GYN H&P     10/22/2019  3:24 PM    CC: Patient is here for annual and family planning. HPI: Patient is a 26year old  for annual. No previous pap. She recently stopped OCP's. No recent sex. Libido is stable. Seeing derm for acne.  Her acne was a l GENERAL: well developed, well nourished, in no apparent distress  SKIN: no rashes, no suspicious lesions  HEENT: normal  NECK: supple; no thyroidmegaly, no adenopathy  BREASTS: symmetrical, nontender, no palpable masses or nodes, no nipple discharge, no Eye Shield Used: No

## 2023-09-12 ENCOUNTER — E-VISIT (OUTPATIENT)
Dept: TELEHEALTH | Age: 28
End: 2023-09-12

## 2023-09-12 DIAGNOSIS — Z02.89 ENCOUNTER TO OBTAIN EXCUSE FROM WORK: ICD-10-CM

## 2023-09-12 DIAGNOSIS — U07.1 COVID: Primary | ICD-10-CM

## 2023-09-12 PROCEDURE — 99422 OL DIG E/M SVC 11-20 MIN: CPT | Performed by: PHYSICIAN ASSISTANT

## 2023-09-30 DIAGNOSIS — F90.0 ATTENTION DEFICIT HYPERACTIVITY DISORDER, PREDOMINANTLY INATTENTIVE TYPE: ICD-10-CM

## 2023-10-02 DIAGNOSIS — F90.0 ATTENTION DEFICIT HYPERACTIVITY DISORDER, PREDOMINANTLY INATTENTIVE TYPE: ICD-10-CM

## 2023-10-02 RX ORDER — DEXTROAMPHETAMINE SACCHARATE, AMPHETAMINE ASPARTATE, DEXTROAMPHETAMINE SULFATE AND AMPHETAMINE SULFATE 5; 5; 5; 5 MG/1; MG/1; MG/1; MG/1
20 TABLET ORAL DAILY
Qty: 30 TABLET | Refills: 0 | Status: SHIPPED | OUTPATIENT
Start: 2023-10-02

## 2023-10-02 NOTE — TELEPHONE ENCOUNTER
Please review. Protocol failed/ No protocol      Requested Prescriptions   Pending Prescriptions Disp Refills    amphetamine-dextroamphetamine 20 MG Oral Tab 30 tablet 0     Sig: Take 1 tablet (20 mg total) by mouth daily.        There is no refill protocol information for this order          Future Appointments         Provider Department Appt Notes    In 2 weeks Asmita Butcher MD [address] [place] [address], [address] [facility]             Recent Outpatient Visits              2 weeks ago [place], [place] Visit Megan Huitron PA-C    E-Visit    2 months ago Encounter to establish care    [address] [place] [address] MARLENE Harding    Office Visit    1 year ago LLQ pain    [address], Macie Weber, Chioma Villa MD    Office Visit    1 year ago Encounter for gynecological examination without abnormal finding    [address], Thanh Olivier MD    Office Visit    2 years ago Postop check    [address], Macie Weber, Chioma Villa MD    Office Visit

## 2023-10-03 RX ORDER — DEXTROAMPHETAMINE SACCHARATE, AMPHETAMINE ASPARTATE, DEXTROAMPHETAMINE SULFATE AND AMPHETAMINE SULFATE 5; 5; 5; 5 MG/1; MG/1; MG/1; MG/1
20 TABLET ORAL DAILY
Qty: 30 TABLET | Refills: 0 | Status: SHIPPED | OUTPATIENT
Start: 2023-10-03

## 2023-10-03 NOTE — TELEPHONE ENCOUNTER
Spoke with pharmacist Cole @ [place] out of medication and script cancelled.     Pend for review/approval

## 2023-10-03 NOTE — TELEPHONE ENCOUNTER
Please review; protocol failed. Medication is cancelled     Daniela Olivera, RN9 minutes ago (1:29 PM)     MM  Spoke with pharmacist Cole @ [place] out of medication and script cancelled.      Pend for review/approval        Recent Outpatient Visits              3 weeks ago Wilian Rizvi, Virtual Visit Dillon Seals PA-C    E-Visit    2 months ago Encounter to establish care    Ashley Mcdaniel, [facility] MARLENE Calero    Office Visit    1 year ago LLQ pain    [address], Greer Morgan MD    Office Visit    1 year ago Encounter for gynecological examination without abnormal finding    Noris Wells MD    Office Visit    2 years ago Postop check    Noris Wells MD    Office Visit          Future Appointments         Provider Department Appt Notes    In 1 week MD Ashley Hernandez,  [address] [facility]

## 2023-10-03 NOTE — TELEPHONE ENCOUNTER
Please call previous pharmacy to cancel prescription.   Once old prescription is canceled I can send a new prescription - MATTIE Johnson

## 2023-10-16 ENCOUNTER — OFFICE VISIT (OUTPATIENT)
Dept: OBGYN CLINIC | Facility: CLINIC | Age: 28
End: 2023-10-16
Payer: COMMERCIAL

## 2023-10-16 VITALS
WEIGHT: 120 LBS | BODY MASS INDEX: 20.49 KG/M2 | DIASTOLIC BLOOD PRESSURE: 60 MMHG | SYSTOLIC BLOOD PRESSURE: 120 MMHG | HEIGHT: 64 IN

## 2023-10-16 DIAGNOSIS — Z30.430 ENCOUNTER FOR INSERTION OF INTRAUTERINE CONTRACEPTIVE DEVICE (IUD): Primary | ICD-10-CM

## 2023-10-16 LAB
CONTROL LINE PRESENT WITH A CLEAR BACKGROUND (YES/NO): YES YES/NO
KIT EXPIRATION DATE: NORMAL DATE
KIT LOT #: NORMAL NUMERIC
PREGNANCY TEST, URINE: NEGATIVE

## 2023-10-16 RX ORDER — COPPER 313.4 MG/1
1 INTRAUTERINE DEVICE INTRAUTERINE ONCE
Status: COMPLETED | OUTPATIENT
Start: 2023-10-16 | End: 2023-10-16

## 2023-10-16 RX ADMIN — COPPER 1 DEVICE: 313.4 INTRAUTERINE DEVICE INTRAUTERINE at 16:41:00

## 2023-11-06 DIAGNOSIS — F90.0 ATTENTION DEFICIT HYPERACTIVITY DISORDER, PREDOMINANTLY INATTENTIVE TYPE: ICD-10-CM

## 2023-11-07 ENCOUNTER — HOSPITAL ENCOUNTER (EMERGENCY)
Facility: HOSPITAL | Age: 28
Discharge: HOME | End: 2023-11-07
Attending: EMERGENCY MEDICINE
Payer: COMMERCIAL

## 2023-11-07 ENCOUNTER — HOSPITAL ENCOUNTER (OUTPATIENT)
Age: 28
Discharge: ANOTHER HOSPITAL | End: 2023-11-07
Payer: COMMERCIAL

## 2023-11-07 ENCOUNTER — APPOINTMENT (OUTPATIENT)
Dept: CT IMAGING | Facility: HOSPITAL | Age: 28
End: 2023-11-07
Attending: EMERGENCY MEDICINE
Payer: COMMERCIAL

## 2023-11-07 VITALS
HEART RATE: 75 BPM | OXYGEN SATURATION: 100 % | WEIGHT: 120 LBS | BODY MASS INDEX: 20.49 KG/M2 | RESPIRATION RATE: 18 BRPM | TEMPERATURE: 98 F | SYSTOLIC BLOOD PRESSURE: 126 MMHG | HEIGHT: 64 IN | DIASTOLIC BLOOD PRESSURE: 94 MMHG

## 2023-11-07 VITALS
RESPIRATION RATE: 18 BRPM | TEMPERATURE: 98 F | OXYGEN SATURATION: 100 % | HEART RATE: 92 BPM | DIASTOLIC BLOOD PRESSURE: 88 MMHG | SYSTOLIC BLOOD PRESSURE: 126 MMHG

## 2023-11-07 DIAGNOSIS — R10.9 ABDOMINAL PAIN, ACUTE: Primary | ICD-10-CM

## 2023-11-07 DIAGNOSIS — R10.31 ABDOMINAL PAIN, RIGHT LOWER QUADRANT: Primary | ICD-10-CM

## 2023-11-07 LAB
ALBUMIN SERPL-MCNC: 4.4 G/DL (ref 3.2–4.8)
ALBUMIN/GLOB SERPL: 1.6 {RATIO} (ref 1–2)
ALP LIVER SERPL-CCNC: 71 U/L
ALT SERPL-CCNC: 12 U/L
ANION GAP SERPL CALC-SCNC: 7 MMOL/L (ref 0–18)
AST SERPL-CCNC: 17 U/L (ref ?–34)
B-HCG UR QL: NEGATIVE
BASOPHILS # BLD AUTO: 0.04 X10(3) UL (ref 0–0.2)
BASOPHILS NFR BLD AUTO: 0.5 %
BILIRUB SERPL-MCNC: 0.8 MG/DL (ref 0.3–1.2)
BILIRUB UR QL STRIP: NEGATIVE
BUN BLD-MCNC: 7 MG/DL (ref 9–23)
BUN/CREAT SERPL: 9.6 (ref 10–20)
CALCIUM BLD-MCNC: 9.1 MG/DL (ref 8.7–10.4)
CHLORIDE SERPL-SCNC: 104 MMOL/L (ref 98–112)
CLARITY UR: CLEAR
CO2 SERPL-SCNC: 25 MMOL/L (ref 21–32)
COLOR UR: YELLOW
CREAT BLD-MCNC: 0.73 MG/DL
DEPRECATED RDW RBC AUTO: 43.7 FL (ref 35.1–46.3)
EGFRCR SERPLBLD CKD-EPI 2021: 116 ML/MIN/1.73M2 (ref 60–?)
EOSINOPHIL # BLD AUTO: 0.05 X10(3) UL (ref 0–0.7)
EOSINOPHIL NFR BLD AUTO: 0.7 %
ERYTHROCYTE [DISTWIDTH] IN BLOOD BY AUTOMATED COUNT: 12.3 % (ref 11–15)
GLOBULIN PLAS-MCNC: 2.7 G/DL (ref 2.8–4.4)
GLUCOSE BLD-MCNC: 90 MG/DL (ref 70–99)
GLUCOSE UR STRIP-MCNC: NEGATIVE MG/DL
HCT VFR BLD AUTO: 42.5 %
HGB BLD-MCNC: 14.3 G/DL
IMM GRANULOCYTES # BLD AUTO: 0.02 X10(3) UL (ref 0–1)
IMM GRANULOCYTES NFR BLD: 0.3 %
KETONES UR STRIP-MCNC: NEGATIVE MG/DL
LYMPHOCYTES # BLD AUTO: 2.17 X10(3) UL (ref 1–4)
LYMPHOCYTES NFR BLD AUTO: 28.9 %
MCH RBC QN AUTO: 32.1 PG (ref 26–34)
MCHC RBC AUTO-ENTMCNC: 33.6 G/DL (ref 31–37)
MCV RBC AUTO: 95.3 FL
MONOCYTES # BLD AUTO: 0.57 X10(3) UL (ref 0.1–1)
MONOCYTES NFR BLD AUTO: 7.6 %
NEUTROPHILS # BLD AUTO: 4.66 X10 (3) UL (ref 1.5–7.7)
NEUTROPHILS # BLD AUTO: 4.66 X10(3) UL (ref 1.5–7.7)
NEUTROPHILS NFR BLD AUTO: 62 %
NITRITE UR QL STRIP: NEGATIVE
OSMOLALITY SERPL CALC.SUM OF ELEC: 280 MOSM/KG (ref 275–295)
PH UR STRIP: 7 [PH]
PLATELET # BLD AUTO: 261 10(3)UL (ref 150–450)
POTASSIUM SERPL-SCNC: 3.8 MMOL/L (ref 3.5–5.1)
PROT SERPL-MCNC: 7.1 G/DL (ref 5.7–8.2)
PROT UR STRIP-MCNC: NEGATIVE MG/DL
RBC # BLD AUTO: 4.46 X10(6)UL
SODIUM SERPL-SCNC: 136 MMOL/L (ref 136–145)
SP GR UR STRIP: 1.01
UROBILINOGEN UR STRIP-ACNC: <2 MG/DL
WBC # BLD AUTO: 7.5 X10(3) UL (ref 4–11)

## 2023-11-07 PROCEDURE — 80053 COMPREHEN METABOLIC PANEL: CPT | Performed by: EMERGENCY MEDICINE

## 2023-11-07 PROCEDURE — 99284 EMERGENCY DEPT VISIT MOD MDM: CPT

## 2023-11-07 PROCEDURE — 99285 EMERGENCY DEPT VISIT HI MDM: CPT

## 2023-11-07 PROCEDURE — 81025 URINE PREGNANCY TEST: CPT | Performed by: NURSE PRACTITIONER

## 2023-11-07 PROCEDURE — 36415 COLL VENOUS BLD VENIPUNCTURE: CPT

## 2023-11-07 PROCEDURE — 81002 URINALYSIS NONAUTO W/O SCOPE: CPT | Performed by: NURSE PRACTITIONER

## 2023-11-07 PROCEDURE — 99215 OFFICE O/P EST HI 40 MIN: CPT | Performed by: NURSE PRACTITIONER

## 2023-11-07 PROCEDURE — 85025 COMPLETE CBC W/AUTO DIFF WBC: CPT | Performed by: EMERGENCY MEDICINE

## 2023-11-07 PROCEDURE — 74177 CT ABD & PELVIS W/CONTRAST: CPT | Performed by: EMERGENCY MEDICINE

## 2023-11-07 RX ORDER — DEXTROAMPHETAMINE SACCHARATE, AMPHETAMINE ASPARTATE, DEXTROAMPHETAMINE SULFATE AND AMPHETAMINE SULFATE 5; 5; 5; 5 MG/1; MG/1; MG/1; MG/1
20 TABLET ORAL DAILY
Qty: 30 TABLET | Refills: 0 | Status: SHIPPED | OUTPATIENT
Start: 2023-11-07 | End: 2023-12-03

## 2023-11-07 RX ORDER — IOHEXOL 350 MG/ML
85 INJECTION, SOLUTION INTRAVENOUS
Status: COMPLETED | OUTPATIENT
Start: 2023-11-07 | End: 2023-11-07

## 2023-11-07 NOTE — TELEPHONE ENCOUNTER
Please call patient to schedule follow up for ADHD monitoring by December. Last visit was 8/2/23. Thank you - MATTIE Melo

## 2023-11-07 NOTE — ED INITIAL ASSESSMENT (HPI)
Pt presents to ed with c/o RLQ pain. Pt states she was at [facility] and they sent her to the ED for imaging. Pt states the pain started yesterday and worsened since. Denies n/v/d or urinary symptoms.

## 2023-11-07 NOTE — TELEPHONE ENCOUNTER
Review pended refill request as it does not fall under a protocol.    Last Rx: 10/03/23    Requested Prescriptions   Pending Prescriptions Disp Refills    amphetamine-dextroamphetamine 20 MG Oral Tab 30 tablet 0     Sig: Take 1 tablet (20 mg total) by mouth daily.       There is no refill protocol information for this order

## 2023-11-15 ENCOUNTER — PATIENT MESSAGE (OUTPATIENT)
Dept: OBGYN CLINIC | Facility: CLINIC | Age: 28
End: 2023-11-15

## 2023-11-16 NOTE — TELEPHONE ENCOUNTER
Radha Rush MD  You3 hours ago (9:08 AM)     She has a new paragard IUD, so it may be due to that. She can see me or we could send in lysteda for her. Gabriel ADEN MD3 hours ago (8:57 AM)     AD  Do you want her to f/u or do ultrasound?   Please advise

## 2023-11-17 RX ORDER — TRANEXAMIC ACID 650 MG/1
TABLET ORAL
Qty: 30 TABLET | Refills: 0 | Status: SHIPPED | OUTPATIENT
Start: 2023-11-17

## 2023-12-03 DIAGNOSIS — F90.0 ATTENTION DEFICIT HYPERACTIVITY DISORDER, PREDOMINANTLY INATTENTIVE TYPE: ICD-10-CM

## 2023-12-05 RX ORDER — DEXTROAMPHETAMINE SACCHARATE, AMPHETAMINE ASPARTATE, DEXTROAMPHETAMINE SULFATE AND AMPHETAMINE SULFATE 5; 5; 5; 5 MG/1; MG/1; MG/1; MG/1
20 TABLET ORAL DAILY
Qty: 30 TABLET | Refills: 0 | Status: SHIPPED | OUTPATIENT
Start: 2023-12-05

## 2023-12-05 NOTE — TELEPHONE ENCOUNTER
Please review; protocol failed. Requested Prescriptions   Pending Prescriptions Disp Refills    amphetamine-dextroamphetamine 20 MG Oral Tab 30 tablet 0     Sig: Take 1 tablet (20 mg total) by mouth daily.        There is no refill protocol information for this order          Recent Outpatient Visits              3 weeks ago Generalized anxiety disorder    [facility], MARLENE [facility]rell    Office Visit    1 month ago Encounter for insertion of intrauterine contraceptive device (IUD)    Marianna Harvey MD    Office Visit    2 months ago Wilian Rizvi, [place] Visit Jeanie Carpenter PA-C    E-Visit    4 months ago Encounter to establish care    [facility] [address] [facility], MARLENE Jarrell    Office Visit    1 year ago LLQ pain    [address], [facility], [address] Ros[address] Hinkle MD    Office Visit

## 2024-01-17 DIAGNOSIS — F90.0 ATTENTION DEFICIT HYPERACTIVITY DISORDER, PREDOMINANTLY INATTENTIVE TYPE: ICD-10-CM

## 2024-01-17 DIAGNOSIS — F90.9 ATTENTION DEFICIT HYPERACTIVITY DISORDER (ADHD), UNSPECIFIED ADHD TYPE: ICD-10-CM

## 2024-01-18 RX ORDER — DEXTROAMPHETAMINE SACCHARATE, AMPHETAMINE ASPARTATE, DEXTROAMPHETAMINE SULFATE AND AMPHETAMINE SULFATE 2.5; 2.5; 2.5; 2.5 MG/1; MG/1; MG/1; MG/1
10 TABLET ORAL DAILY
Qty: 30 TABLET | Refills: 0 | OUTPATIENT
Start: 2024-01-18 | End: 2024-02-17

## 2024-01-18 RX ORDER — DEXTROAMPHETAMINE SACCHARATE, AMPHETAMINE ASPARTATE, DEXTROAMPHETAMINE SULFATE AND AMPHETAMINE SULFATE 5; 5; 5; 5 MG/1; MG/1; MG/1; MG/1
20 TABLET ORAL DAILY
Qty: 30 TABLET | Refills: 0 | Status: SHIPPED | OUTPATIENT
Start: 2024-01-18

## 2024-01-18 NOTE — TELEPHONE ENCOUNTER
Please review; protocol failed/ has no protocol    Requested Prescriptions   Pending Prescriptions Disp Refills    amphetamine-dextroamphetamine 20 MG Oral Tab 30 tablet 0     Sig: Take 1 tablet (20 mg total) by mouth daily.       There is no refill protocol information for this order      Refused Prescriptions Disp Refills    amphetamine-dextroamphetamine (ADDERALL) 10 MG Oral Tab 30 tablet 0     Sig: Take 1 tablet (10 mg total) by mouth daily. 10mg in the Afternoon if needed       There is no refill protocol information for this order        Recent Outpatient Visits              1 month ago Attention deficit hyperactivity disorder (ADHD), unspecified ADHD type    [facility], [facility] Dora Grey APRN    Telemedicine    2 months ago Generalized anxiety disorder    [facility] oDra Grey APRN    Office Visit    3 months ago Encounter for insertion of intrauterine contraceptive device (IUD)    [facility] - OB/GYN Kendra Butcher MD    Office Visit    4 months ago COVID    [facility], Virtual Visit Dahiana Rogers PA-C    E-Visit    5 months ago Encounter to establish care    [facility] Dora Grey APRN    Office Visit

## 2024-02-20 ENCOUNTER — TELEPHONE (OUTPATIENT)
Dept: FAMILY MEDICINE CLINIC | Facility: CLINIC | Age: 29
End: 2024-02-20

## 2024-02-20 DIAGNOSIS — F90.0 ATTENTION DEFICIT HYPERACTIVITY DISORDER, PREDOMINANTLY INATTENTIVE TYPE: ICD-10-CM

## 2024-02-22 NOTE — TELEPHONE ENCOUNTER
No protocol for requested medication.  Please advise on refill request.      Requested Prescriptions     Pending Prescriptions Disp Refills    amphetamine-dextroamphetamine 20 MG Oral Tab 30 tablet 0     Sig: Take 1 tablet (20 mg total) by mouth daily.      Recent Visits  Date Type Provider Dept   11/13/23 Office Visit Dora Grey APRN [facility]   08/02/23 Office Visit Dora Grey APRN [facility]   Showing recent visits within past 540 days with a meds authorizing provider and meeting all other requirements  Future Appointments  No visits were found meeting these conditions.  Showing future appointments within next 150 days with a meds authorizing provider and meeting all other requirements     Requested Prescriptions   Pending Prescriptions Disp Refills    amphetamine-dextroamphetamine 20 MG Oral Tab 30 tablet 0     Sig: Take 1 tablet (20 mg total) by mouth daily.       Controlled Substance Medication Failed - 2/20/2024  4:28 PM        Failed - This medication is a controlled substance - forward to provider to refill               Recent Outpatient Visits              2 months ago Attention deficit hyperactivity disorder (ADHD), unspecified ADHD type    [facility], [facility] Dora Grey APRN    Telemedicine    3 months ago Generalized anxiety disorder    [facility] Dora Grey APRN    Office Visit    4 months ago Encounter for insertion of intrauterine contraceptive device (IUD)    [facility] - OB/GYN Kendra Butcher MD    Office Visit    5 months ago COVID    [facility], Virtual Visit Dahiana Rogers PA-C    E-Visit    6 months ago Encounter to establish care    [facility] Dora Grey APRN    Office Visit

## 2024-02-23 RX ORDER — DEXTROAMPHETAMINE SACCHARATE, AMPHETAMINE ASPARTATE, DEXTROAMPHETAMINE SULFATE AND AMPHETAMINE SULFATE 5; 5; 5; 5 MG/1; MG/1; MG/1; MG/1
20 TABLET ORAL DAILY
Qty: 30 TABLET | Refills: 0 | Status: SHIPPED | OUTPATIENT
Start: 2024-02-23 | End: 2024-02-24

## 2024-02-23 NOTE — TELEPHONE ENCOUNTER
Please have patient schedule 3 month follow up. Can offer virtual appointments on Fridays. - MATTIE Melo

## 2024-02-23 NOTE — TELEPHONE ENCOUNTER
Left Voicemail to call back our office to make an appointment  . Office phone number provided with office telephone hours.       [facility] staff    This medication could not be e-scribed likely due to the nationwide [employer]  issue announced on 2/22/24.    Provider, please print the prescription instead.   Clinical staff, please let the patient know once the printed rx is available for  at your office.

## 2024-02-26 RX ORDER — DEXTROAMPHETAMINE SACCHARATE, AMPHETAMINE ASPARTATE, DEXTROAMPHETAMINE SULFATE AND AMPHETAMINE SULFATE 5; 5; 5; 5 MG/1; MG/1; MG/1; MG/1
20 TABLET ORAL DAILY
Qty: 30 TABLET | Refills: 0 | Status: SHIPPED | OUTPATIENT
Start: 2024-02-26

## 2024-03-05 PROBLEM — F32.9 REACTIVE DEPRESSION: Status: RESOLVED | Noted: 2017-11-14 | Resolved: 2024-03-05

## 2024-03-05 PROBLEM — F32.81 PMDD (PREMENSTRUAL DYSPHORIC DISORDER): Status: RESOLVED | Noted: 2020-12-08 | Resolved: 2024-03-05

## 2024-06-11 DIAGNOSIS — F90.0 ATTENTION DEFICIT HYPERACTIVITY DISORDER, PREDOMINANTLY INATTENTIVE TYPE: ICD-10-CM

## 2024-06-14 RX ORDER — DEXTROAMPHETAMINE SACCHARATE, AMPHETAMINE ASPARTATE, DEXTROAMPHETAMINE SULFATE AND AMPHETAMINE SULFATE 5; 5; 5; 5 MG/1; MG/1; MG/1; MG/1
20 TABLET ORAL DAILY
Qty: 30 TABLET | Refills: 0 | Status: CANCELLED | OUTPATIENT
Start: 2024-06-14 | End: 2024-07-14

## 2024-06-14 RX ORDER — DEXTROAMPHETAMINE SACCHARATE, AMPHETAMINE ASPARTATE, DEXTROAMPHETAMINE SULFATE AND AMPHETAMINE SULFATE 5; 5; 5; 5 MG/1; MG/1; MG/1; MG/1
20 TABLET ORAL DAILY
Qty: 30 TABLET | Refills: 0 | Status: CANCELLED | OUTPATIENT
Start: 2024-07-15 | End: 2024-08-14

## 2024-06-14 RX ORDER — DEXTROAMPHETAMINE SACCHARATE, AMPHETAMINE ASPARTATE, DEXTROAMPHETAMINE SULFATE AND AMPHETAMINE SULFATE 5; 5; 5; 5 MG/1; MG/1; MG/1; MG/1
20 TABLET ORAL DAILY
Qty: 30 TABLET | Refills: 0 | OUTPATIENT
Start: 2024-08-15 | End: 2024-09-14

## 2024-06-14 NOTE — TELEPHONE ENCOUNTER
Please review. Protocol Failed; No Protocol    Adderall 20mg panel pended:       Recent fills: 2/27/2024, 4/3/2024, 5/6/2024  Last Rx written: 3/5/2024  Last office visit: 3/5/2024          Requested Prescriptions   Pending Prescriptions Disp Refills    amphetamine-dextroamphetamine (ADDERALL) 20 MG Oral Tab 30 tablet 0     Sig: Take 1 tablet (20 mg total) by mouth daily.       Controlled Substance Medication Failed - 6/14/2024  3:56 PM        Failed - This medication is a controlled substance - forward to provider to refill          amphetamine-dextroamphetamine (ADDERALL) 20 MG Oral Tab 30 tablet 0     Sig: Take 1 tablet (20 mg total) by mouth daily.       Controlled Substance Medication Failed - 6/14/2024  3:56 PM        Failed - This medication is a controlled substance - forward to provider to refill          amphetamine-dextroamphetamine (ADDERALL) 20 MG Oral Tab 30 tablet 0     Sig: Take 1 tablet (20 mg total) by mouth daily.       Controlled Substance Medication Failed - 6/14/2024  3:56 PM        Failed - This medication is a controlled substance - forward to provider to refill         Refused Prescriptions Disp Refills    amphetamine-dextroamphetamine 20 MG Oral Tab [Pharmacy Med Name: Amphetamine/Dextroamphetamine 20 Mg Tab Elit] 30 tablet 0     Sig: Take 1 tablet (20 mg total) by mouth daily.       Controlled Substance Medication Failed - 6/14/2024  3:56 PM        Failed - This medication is a controlled substance - forward to provider to refill                 Recent Outpatient Visits              3 months ago Attention deficit hyperactivity disorder, predominantly inattentive type    [facility], [facility] Dora Gaitan APRN    Office Visit    6 months ago Attention deficit hyperactivity disorder (ADHD), unspecified ADHD type    [facility] Dora Grey APRN    Telemedicine    7 months ago Generalized anxiety disorder     [facility], [facility] Dora Grey APRN    Office Visit    8 months ago Encounter for insertion of intrauterine contraceptive device (IUD)    [facility], [facility] - OB/GYN Kendra Butcher MD    Office Visit    9 months ago COVID    [facility], Virtual Visit Dahiana Rogers PA-C    E-Visit

## 2024-06-17 DIAGNOSIS — F90.0 ATTENTION DEFICIT HYPERACTIVITY DISORDER, PREDOMINANTLY INATTENTIVE TYPE: ICD-10-CM

## 2024-06-17 RX ORDER — DEXTROAMPHETAMINE SACCHARATE, AMPHETAMINE ASPARTATE, DEXTROAMPHETAMINE SULFATE AND AMPHETAMINE SULFATE 5; 5; 5; 5 MG/1; MG/1; MG/1; MG/1
20 TABLET ORAL DAILY
Qty: 30 TABLET | Refills: 0 | Status: SHIPPED | OUTPATIENT
Start: 2024-06-24 | End: 2024-07-24

## 2024-06-17 RX ORDER — DEXTROAMPHETAMINE SACCHARATE, AMPHETAMINE ASPARTATE, DEXTROAMPHETAMINE SULFATE AND AMPHETAMINE SULFATE 5; 5; 5; 5 MG/1; MG/1; MG/1; MG/1
20 TABLET ORAL DAILY
Qty: 30 TABLET | Refills: 0 | Status: CANCELLED | OUTPATIENT
Start: 2024-06-17 | End: 2024-07-17

## 2024-06-17 RX ORDER — DEXTROAMPHETAMINE SACCHARATE, AMPHETAMINE ASPARTATE, DEXTROAMPHETAMINE SULFATE AND AMPHETAMINE SULFATE 5; 5; 5; 5 MG/1; MG/1; MG/1; MG/1
20 TABLET ORAL DAILY
Qty: 30 TABLET | Refills: 0 | Status: SHIPPED | OUTPATIENT
Start: 2024-07-24 | End: 2024-08-23

## 2024-06-17 RX ORDER — DEXTROAMPHETAMINE SACCHARATE, AMPHETAMINE ASPARTATE, DEXTROAMPHETAMINE SULFATE AND AMPHETAMINE SULFATE 5; 5; 5; 5 MG/1; MG/1; MG/1; MG/1
20 TABLET ORAL DAILY
Qty: 30 TABLET | Refills: 0 | Status: SHIPPED | OUTPATIENT
Start: 2024-08-23 | End: 2024-09-22

## 2024-06-18 ENCOUNTER — PATIENT MESSAGE (OUTPATIENT)
Dept: FAMILY MEDICINE CLINIC | Facility: CLINIC | Age: 29
End: 2024-06-18

## 2024-06-18 DIAGNOSIS — F90.0 ATTENTION DEFICIT HYPERACTIVITY DISORDER, PREDOMINANTLY INATTENTIVE TYPE: ICD-10-CM

## 2024-06-18 NOTE — TELEPHONE ENCOUNTER
From: Ludy Lew  To: Dora Grey  Sent: 6/18/2024 10:10 AM CDT  Subject: Refill     Hi! I went to refill my Adderall 20mg but the pharmacy said that the start date wasn’t until the 24th. The last time I refilled it was May 6th so I am completely out. Could we possibly change the start date?

## 2024-06-19 RX ORDER — DEXTROAMPHETAMINE SACCHARATE, AMPHETAMINE ASPARTATE, DEXTROAMPHETAMINE SULFATE AND AMPHETAMINE SULFATE 5; 5; 5; 5 MG/1; MG/1; MG/1; MG/1
20 TABLET ORAL DAILY
Qty: 30 TABLET | Refills: 0 | Status: CANCELLED | OUTPATIENT
Start: 2024-07-20 | End: 2024-08-19

## 2024-06-19 RX ORDER — DEXTROAMPHETAMINE SACCHARATE, AMPHETAMINE ASPARTATE, DEXTROAMPHETAMINE SULFATE AND AMPHETAMINE SULFATE 5; 5; 5; 5 MG/1; MG/1; MG/1; MG/1
20 TABLET ORAL DAILY
Qty: 30 TABLET | Refills: 0 | OUTPATIENT
Start: 2024-07-20 | End: 2024-08-19

## 2024-06-19 RX ORDER — DEXTROAMPHETAMINE SACCHARATE, AMPHETAMINE ASPARTATE, DEXTROAMPHETAMINE SULFATE AND AMPHETAMINE SULFATE 5; 5; 5; 5 MG/1; MG/1; MG/1; MG/1
20 TABLET ORAL DAILY
Qty: 30 TABLET | Refills: 0 | Status: CANCELLED | OUTPATIENT
Start: 2024-06-19 | End: 2024-07-19

## 2024-06-19 RX ORDER — DEXTROAMPHETAMINE SACCHARATE, AMPHETAMINE ASPARTATE, DEXTROAMPHETAMINE SULFATE AND AMPHETAMINE SULFATE 5; 5; 5; 5 MG/1; MG/1; MG/1; MG/1
20 TABLET ORAL DAILY
Qty: 30 TABLET | Refills: 0 | Status: SHIPPED | OUTPATIENT
Start: 2024-06-19 | End: 2024-07-19

## 2024-06-19 RX ORDER — DEXTROAMPHETAMINE SACCHARATE, AMPHETAMINE ASPARTATE, DEXTROAMPHETAMINE SULFATE AND AMPHETAMINE SULFATE 5; 5; 5; 5 MG/1; MG/1; MG/1; MG/1
20 TABLET ORAL DAILY
Qty: 30 TABLET | Refills: 0 | OUTPATIENT
Start: 2024-06-19 | End: 2024-07-19

## 2024-06-19 RX ORDER — DEXTROAMPHETAMINE SACCHARATE, AMPHETAMINE ASPARTATE, DEXTROAMPHETAMINE SULFATE AND AMPHETAMINE SULFATE 5; 5; 5; 5 MG/1; MG/1; MG/1; MG/1
20 TABLET ORAL DAILY
Qty: 30 TABLET | Refills: 0 | OUTPATIENT
Start: 2024-08-20 | End: 2024-09-19

## 2024-06-19 RX ORDER — DEXTROAMPHETAMINE SACCHARATE, AMPHETAMINE ASPARTATE, DEXTROAMPHETAMINE SULFATE AND AMPHETAMINE SULFATE 5; 5; 5; 5 MG/1; MG/1; MG/1; MG/1
20 TABLET ORAL DAILY
Qty: 30 TABLET | Refills: 0 | Status: SHIPPED | OUTPATIENT
Start: 2024-08-20 | End: 2024-09-19

## 2024-06-19 RX ORDER — DEXTROAMPHETAMINE SACCHARATE, AMPHETAMINE ASPARTATE, DEXTROAMPHETAMINE SULFATE AND AMPHETAMINE SULFATE 5; 5; 5; 5 MG/1; MG/1; MG/1; MG/1
20 TABLET ORAL DAILY
Qty: 30 TABLET | Refills: 0 | Status: CANCELLED
Start: 2024-06-19 | End: 2024-07-19

## 2024-06-19 RX ORDER — DEXTROAMPHETAMINE SACCHARATE, AMPHETAMINE ASPARTATE, DEXTROAMPHETAMINE SULFATE AND AMPHETAMINE SULFATE 5; 5; 5; 5 MG/1; MG/1; MG/1; MG/1
20 TABLET ORAL DAILY
Qty: 30 TABLET | Refills: 0 | Status: SHIPPED | OUTPATIENT
Start: 2024-07-20 | End: 2024-08-19

## 2024-06-19 RX ORDER — DEXTROAMPHETAMINE SACCHARATE, AMPHETAMINE ASPARTATE, DEXTROAMPHETAMINE SULFATE AND AMPHETAMINE SULFATE 5; 5; 5; 5 MG/1; MG/1; MG/1; MG/1
20 TABLET ORAL DAILY
Qty: 30 TABLET | Refills: 0 | Status: CANCELLED | OUTPATIENT
Start: 2024-08-20 | End: 2024-09-19

## 2024-06-19 NOTE — TELEPHONE ENCOUNTER
Please review; please    Please disregard sent duplicate just approved today       Ludy Farnsworthbrandon FUENTES [facility] Refills2 days ago     ML  Refills have been requested for the following medications:         amphetamine-dextroamphetamine (ADDERALL) 20 MG Oral Tab [Dora Grey]      Patient Comment: I wasnt able to refill y last perscription before ir   Requested Prescriptions   Pending Prescriptions Disp Refills    amphetamine-dextroamphetamine (ADDERALL) 20 MG Oral Tab 30 tablet 0     Sig: Take 1 tablet (20 mg total) by mouth daily.       Controlled Substance Medication Failed - 2024  9:21 AM        Failed - This medication is a controlled substance - forward to provider to refill          amphetamine-dextroamphetamine (ADDERALL) 20 MG Oral Tab 30 tablet 0     Sig: Take 1 tablet (20 mg total) by mouth daily.       Controlled Substance Medication Failed - 2024  9:21 AM        Failed - This medication is a controlled substance - forward to provider to refill          amphetamine-dextroamphetamine (ADDERALL) 20 MG Oral Tab 30 tablet 0     Sig: Take 1 tablet (20 mg total) by mouth daily.       Controlled Substance Medication Failed - 2024  9:21 AM        Failed - This medication is a controlled substance - forward to provider to refill           Recent Outpatient Visits              3 months ago Attention deficit hyperactivity disorder, predominantly inattentive type    [facility], [facility] Dora Grey APRN    Office Visit    6 months ago Attention deficit hyperactivity disorder (ADHD), unspecified ADHD type    [facility], [facility] Dora Grey APRN    Telemedicine    7 months ago Generalized anxiety disorder    [facility], [facility] Dora Grey APRN    Office Visit    8 months ago Encounter for insertion of intrauterine contraceptive device (IUD)    [facility],  [facility] - OB/GYN Kendra Butcher MD    Office Visit    9 months ago [facility], Virtual Visit Dahiana Rogers PA-C    E-Visit

## 2024-06-19 NOTE — TELEPHONE ENCOUNTER
MARLENE John kindly see patient's MyChart message and advise. Pended prescription for review and approval if appropriate.

## 2024-07-15 ENCOUNTER — OFFICE VISIT (OUTPATIENT)
Dept: FAMILY MEDICINE CLINIC | Facility: CLINIC | Age: 29
End: 2024-07-15
Payer: COMMERCIAL

## 2024-07-15 ENCOUNTER — LAB ENCOUNTER (OUTPATIENT)
Dept: LAB | Age: 29
End: 2024-07-15
Payer: COMMERCIAL

## 2024-07-15 VITALS
SYSTOLIC BLOOD PRESSURE: 103 MMHG | WEIGHT: 117 LBS | DIASTOLIC BLOOD PRESSURE: 68 MMHG | BODY MASS INDEX: 19.49 KG/M2 | HEIGHT: 65 IN

## 2024-07-15 DIAGNOSIS — Z00.00 ENCOUNTER FOR ANNUAL PHYSICAL EXAM: ICD-10-CM

## 2024-07-15 DIAGNOSIS — Z23 NEED FOR VACCINATION: ICD-10-CM

## 2024-07-15 DIAGNOSIS — Z11.3 SCREENING EXAMINATION FOR STD (SEXUALLY TRANSMITTED DISEASE): ICD-10-CM

## 2024-07-15 DIAGNOSIS — F90.9 ATTENTION DEFICIT HYPERACTIVITY DISORDER (ADHD), UNSPECIFIED ADHD TYPE: ICD-10-CM

## 2024-07-15 DIAGNOSIS — Z00.00 ENCOUNTER FOR ANNUAL PHYSICAL EXAM: Primary | ICD-10-CM

## 2024-07-15 LAB
CHOLEST SERPL-MCNC: 153 MG/DL (ref ?–200)
FASTING PATIENT LIPID ANSWER: NO
HDLC SERPL-MCNC: 67 MG/DL (ref 40–59)
LDLC SERPL CALC-MCNC: 68 MG/DL (ref ?–100)
NONHDLC SERPL-MCNC: 86 MG/DL (ref ?–130)
T3FREE SERPL-MCNC: 2.86 PG/ML (ref 2.4–4.2)
T4 FREE SERPL-MCNC: 1 NG/DL (ref 0.8–1.7)
TRIGL SERPL-MCNC: 99 MG/DL (ref 30–149)
TSI SER-ACNC: 0.53 MIU/ML (ref 0.55–4.78)
VLDLC SERPL CALC-MCNC: 15 MG/DL (ref 0–30)

## 2024-07-15 PROCEDURE — 84481 FREE ASSAY (FT-3): CPT

## 2024-07-15 PROCEDURE — 87389 HIV-1 AG W/HIV-1&-2 AB AG IA: CPT

## 2024-07-15 PROCEDURE — 84443 ASSAY THYROID STIM HORMONE: CPT

## 2024-07-15 PROCEDURE — 83655 ASSAY OF LEAD: CPT

## 2024-07-15 PROCEDURE — 84439 ASSAY OF FREE THYROXINE: CPT

## 2024-07-15 PROCEDURE — 36415 COLL VENOUS BLD VENIPUNCTURE: CPT

## 2024-07-15 PROCEDURE — 87591 N.GONORRHOEAE DNA AMP PROB: CPT

## 2024-07-15 PROCEDURE — 87491 CHLMYD TRACH DNA AMP PROBE: CPT

## 2024-07-15 PROCEDURE — 80061 LIPID PANEL: CPT

## 2024-07-15 PROCEDURE — 86780 TREPONEMA PALLIDUM: CPT

## 2024-07-15 RX ORDER — DEXTROAMPHETAMINE SACCHARATE, AMPHETAMINE ASPARTATE, DEXTROAMPHETAMINE SULFATE AND AMPHETAMINE SULFATE 2.5; 2.5; 2.5; 2.5 MG/1; MG/1; MG/1; MG/1
TABLET ORAL
Refills: 0 | Status: CANCELLED | OUTPATIENT
Start: 2024-07-15

## 2024-07-15 NOTE — PROGRESS NOTES
Ludy Lew is a 28 year old female.  Chief Complaint   Patient presents with    Routine Physical    ADHD     F/u medication and refill     HPI:   Ludy Lew presented to the clinic for annual physical examination. No acute concerns. No changes in family/personal history. Normal Sleep. Normal appetite. Balanced diet. Normal BM/Urination. Physically active.  Sexually active. One partner.  LMP 6/28/24 IUD in place, regular cycles. HX of ADHD.   Occupation: school RN.       Current Outpatient Medications   Medication Sig Dispense Refill    amphetamine-dextroamphetamine (ADDERALL) 10 MG Oral Tab Take 1 tablet (10 mg total) by mouth daily. In afternoon as needed 30 tablet 0    [START ON 8/15/2024] amphetamine-dextroamphetamine (ADDERALL) 10 MG Oral Tab Take 1 tablet (10 mg total) by mouth daily. In afternoon as needed 30 tablet 0    [START ON 9/15/2024] amphetamine-dextroamphetamine (ADDERALL) 10 MG Oral Tab Take 1 tablet (10 mg total) by mouth daily. In afternoon as needed 30 tablet 0    amphetamine-dextroamphetamine (ADDERALL) 10 MG Oral Tab       amphetamine-dextroamphetamine (ADDERALL) 20 MG Oral Tab Take 1 tablet (20 mg total) by mouth daily. 30 tablet 0    [START ON 7/20/2024] amphetamine-dextroamphetamine (ADDERALL) 20 MG Oral Tab Take 1 tablet (20 mg total) by mouth daily. 30 tablet 0    [START ON 8/20/2024] amphetamine-dextroamphetamine (ADDERALL) 20 MG Oral Tab Take 1 tablet (20 mg total) by mouth daily. 30 tablet 0      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    ASC-H    Migraine    Ovarian cyst      Past Surgical History:   Procedure Laterality Date    Colposcopy, cervix w/upper adjacent vagina; w/biopsy(s), cervix  10/29/2019    Induced abortn by d&c  2023    Insert intrauterine device  12/2019    Mirena IUD     Leep  11/2019    SARAHI iii with positive margins    Removal of ovarian cyst(s)  02/2021    Laparoscoic L  ovarian cystectomy    Tonsillectomy        Social History:  Social History     Socioeconomic History    Marital status: Single   Occupational History    Occupation: School nurse   Tobacco Use    Smoking status: Never    Smokeless tobacco: Current   Vaping Use    Vaping status: Every Day    Substances: Nicotine, CBD    Devices: Disposable, Pre-filled or refillable cartridge, Refillable tank, Pre-filled pod   Substance and Sexual Activity    Alcohol use: Yes     Comment: socially    Drug use: Yes     Types: Cannabis    Sexual activity: Yes     Partners: Male     Birth control/protection: Condom   Other Topics Concern    Blood Transfusions No   Social History Narrative    Live with parents and brother    No h/o abuse      Family History   Problem Relation Age of Onset    Other (AAA) Father     Breast Cancer Maternal Grandmother     Melanoma Maternal Aunt       No Known Allergies     REVIEW OF SYSTEMS:   Review of Systems   Constitutional: Negative.  Negative for activity change.   HENT: Negative.     Eyes: Negative.    Respiratory: Negative.  Negative for chest tightness and shortness of breath.    Cardiovascular: Negative.  Negative for chest pain.   Gastrointestinal:  Negative for abdominal distention, abdominal pain, constipation, diarrhea and nausea.   Genitourinary: Negative.  Negative for difficulty urinating, menstrual problem and vaginal discharge.   Musculoskeletal: Negative.    Skin: Negative.    Neurological: Negative.    Psychiatric/Behavioral: Negative.        Wt Readings from Last 5 Encounters:   07/15/24 117 lb (53.1 kg)   03/05/24 117 lb (53.1 kg)   11/13/23 121 lb (54.9 kg)   11/07/23 120 lb (54.4 kg)   10/16/23 120 lb (54.4 kg)     Body mass index is 19.47 kg/m².      EXAM:   /68 (BP Location: Right arm, Patient Position: Sitting, Cuff Size: adult)   Ht 5' 5\" (1.651 m)   Wt 117 lb (53.1 kg)   LMP 06/28/2024 (Exact Date)   BMI 19.47 kg/m²   Physical Exam  Vitals reviewed.   Constitutional:        Appearance: Normal appearance.   HENT:      Head: Normocephalic and atraumatic.      Right Ear: Tympanic membrane normal.      Left Ear: Tympanic membrane normal.      Mouth/Throat:      Mouth: Mucous membranes are moist.      Pharynx: Oropharynx is clear.   Eyes:      Pupils: Pupils are equal, round, and reactive to light.   Cardiovascular:      Rate and Rhythm: Normal rate and regular rhythm.      Pulses: Normal pulses.      Heart sounds: Normal heart sounds.   Pulmonary:      Effort: Pulmonary effort is normal.      Breath sounds: Normal breath sounds.   Abdominal:      General: Abdomen is flat. There is no distension.      Palpations: Abdomen is soft. There is no mass.      Tenderness: There is no abdominal tenderness. There is no guarding or rebound.      Hernia: No hernia is present.   Musculoskeletal:         General: Normal range of motion.      Cervical back: Normal range of motion.   Skin:     General: Skin is warm.   Neurological:      General: No focal deficit present.      Mental Status: She is alert and oriented to person, place, and time.   Psychiatric:         Mood and Affect: Mood normal.         Behavior: Behavior normal.            ASSESSMENT AND PLAN:   (Z00.00) Encounter for annual physical exam  (primary encounter diagnosis)  Plan: Lipid Panel, TSH W Reflex To Free T4 [E], Lead,        blood [E]        - Immunizations UTD   - tobacco, alcohol, illicit drug use discouraged  - safe sexual practices advised  - Reinforced healthy diet, lifestyle, and exercise.  - Past Medical/Social/Family histories reviewed   - Regular eye exams recommended     Health Maintenance   Topic Date Due    Pap Smear  09/22/2025 (Originally 9/14/2023)       (F90.9) Attention deficit hyperactivity disorder (ADHD), unspecified ADHD type  Plan: Patient taking Adderall 20 mg daily for ADHD.  Takes Adderall 10 mg in the afternoon if needed.  States symptoms well-managed.  Denies side effects.  Continue current management.   3-month refills to pharmacy.  Patient to follow-up in 6 months to monitor.     (Z11.3) Screening examination for STD (sexually transmitted disease)  Plan: HIV AG AB Combo [E], T Pallidum Screening         Cascade TREP [E], Chlamydia/Gc Amplification         [E]      STD screening completed today. Further management pending results.     (Z23) Need for vaccination  Plan: TETANUS, DIPHTHERIA TOXOIDS AND ACELLULAR         PERTUSIS VACCINE (TDAP), >7 YEARS, IM USE,         Immunization Admin Counseling, 1st Component,         18 years and older       TDAP given today informed consent signed.     Follow up in 1 year or sooner if needed      The patient indicates understanding of these issues and agrees to the plan.  Chaperone offered to the patient prior to examination    This note was prepared using Dragon Medical voice recognition dictation software. As a result errors may occur. When identified these errors have been corrected. While every attempt is made to correct errors during dictation discrepancies may still exist.

## 2024-07-16 DIAGNOSIS — R79.89 ABNORMAL TSH: Primary | ICD-10-CM

## 2024-07-16 LAB
C TRACH DNA SPEC QL NAA+PROBE: NEGATIVE
N GONORRHOEA DNA SPEC QL NAA+PROBE: NEGATIVE
T PALLIDUM AB SER QL IA: NONREACTIVE

## 2024-07-17 LAB — LEAD BLOOD ADULT: 1.1 UG/DL

## 2024-08-08 ENCOUNTER — PATIENT MESSAGE (OUTPATIENT)
Dept: OBGYN CLINIC | Facility: CLINIC | Age: 29
End: 2024-08-08

## 2024-08-08 NOTE — TELEPHONE ENCOUNTER
From: Ludy Lew  To: Kendra Butcher  Sent: 8/8/2024 11:43 AM CDT  Subject: Bleeding     I had my period from 7/22 until 8/2 which was a lot longer than my typical period. I started bleeding bright pink blood again on Tuesday (8/6) and have some pretty bad cramps. I can feel both of the strings from my IUD. I’m not bleeding a ton but wanted to reach out because this has not happened before.

## 2024-09-03 DIAGNOSIS — F90.0 ATTENTION DEFICIT HYPERACTIVITY DISORDER, PREDOMINANTLY INATTENTIVE TYPE: ICD-10-CM

## 2024-09-06 RX ORDER — DEXTROAMPHETAMINE SACCHARATE, AMPHETAMINE ASPARTATE, DEXTROAMPHETAMINE SULFATE AND AMPHETAMINE SULFATE 5; 5; 5; 5 MG/1; MG/1; MG/1; MG/1
20 TABLET ORAL DAILY
Qty: 30 TABLET | Refills: 0 | Status: SHIPPED | OUTPATIENT
Start: 2024-09-06 | End: 2024-10-06

## 2024-09-06 NOTE — TELEPHONE ENCOUNTER
Please review. Protocol Failed; No Protocol      Recent fills: 5/6/2024, 6/19/2024, 7/31/2024  Last Rx written: 6/19/2024  Last office visit: 7/15/2024    Recent Visits  Date Type Provider Dept   07/15/24 Office Visit Dora Grey APRN [facility]       Due to back order issue . Pended to [place]    Requested Prescriptions   Pending Prescriptions Disp Refills    amphetamine-dextroamphetamine (ADDERALL) 20 MG Oral Tab 30 tablet 0     Sig: Take 1 tablet (20 mg total) by mouth daily.       Controlled Substance Medication Failed - 9/3/2024  8:58 PM        Failed - This medication is a controlled substance - forward to provider to refill                 Recent Outpatient Visits              1 month ago Encounter for annual physical exam    [facility] Dora Grey APRN    Office Visit    6 months ago Attention deficit hyperactivity disorder, predominantly inattentive type    [facility]    Office Visit    9 months ago Attention deficit hyperactivity disorder (ADHD), unspecified ADHD type    [facility] Dora Grey APRN    Telemedicine    9 months ago Generalized anxiety disorder    [facility] Dora Grey APRN    Office Visit    10 months ago Encounter for insertion of intrauterine contraceptive device (IUD)    [facility] - OB/GYN Kendra Butcher MD    Office Visit

## 2024-10-14 DIAGNOSIS — F90.0 ATTENTION DEFICIT HYPERACTIVITY DISORDER, PREDOMINANTLY INATTENTIVE TYPE: ICD-10-CM

## 2024-10-16 NOTE — TELEPHONE ENCOUNTER
Please review. Protocol Failed; No Protocol      Recent fills: 7/31/2024, 9/6/2024  Last Rx written: 9/6/2024  Last office visit: 7/15/2024      Today's Visits  Date Type Provider Dept   10/16/24 Appointment Dora Grey APRN [facility]   Showing today's visits with a meds authorizing provider and meeting all other requirements  Future Appointments  No visits were found meeting these conditions.  Showing future appointments within next 150 days with a meds authorizing provider and meeting all other requirements        Requested Prescriptions   Pending Prescriptions Disp Refills    AMPHETAMINE-DEXTROAMPHETAMINE 20 MG Oral Tab [Pharmacy Med Name: Amphetamine/Dextroamphetamine 20 Mg Tab Epic] 30 tablet 0     Sig: Take 1 tablet (20 mg total) by mouth daily.       Controlled Substance Medication Failed - 10/16/2024  2:01 PM        Failed - This medication is a controlled substance - forward to provider to refill               Future Appointments         Provider Department Appt Notes    Today Dora Grey APRN [facility] LVM to r/s due to provider out sick Recheck thyroid levels          Recent Outpatient Visits              3 months ago Encounter for annual physical exam    [facility] Dora Grey APRN    Office Visit    7 months ago Attention deficit hyperactivity disorder, predominantly inattentive type    [facility] Dora Gaitan APRN    Office Visit    10 months ago Attention deficit hyperactivity disorder (ADHD), unspecified ADHD type    [facility] Dora Grey APRN    Telemedicine    11 months ago Generalized anxiety disorder    [facility] Dora Grey APRN    Office Visit    1 year ago Encounter for insertion of intrauterine contraceptive device (IUD)    [facility], [facility] - OB/GYN Kendra Butcher MD    Office Visit

## 2024-10-17 RX ORDER — DEXTROAMPHETAMINE SACCHARATE, AMPHETAMINE ASPARTATE, DEXTROAMPHETAMINE SULFATE AND AMPHETAMINE SULFATE 5; 5; 5; 5 MG/1; MG/1; MG/1; MG/1
20 TABLET ORAL DAILY
Qty: 30 TABLET | Refills: 0 | Status: SHIPPED | OUTPATIENT
Start: 2024-12-18 | End: 2024-11-05

## 2024-10-17 RX ORDER — DEXTROAMPHETAMINE SACCHARATE, AMPHETAMINE ASPARTATE, DEXTROAMPHETAMINE SULFATE AND AMPHETAMINE SULFATE 5; 5; 5; 5 MG/1; MG/1; MG/1; MG/1
20 TABLET ORAL DAILY
Qty: 30 TABLET | Refills: 0 | Status: SHIPPED | OUTPATIENT
Start: 2024-11-17 | End: 2024-11-05

## 2024-10-17 RX ORDER — DEXTROAMPHETAMINE SACCHARATE, AMPHETAMINE ASPARTATE, DEXTROAMPHETAMINE SULFATE AND AMPHETAMINE SULFATE 5; 5; 5; 5 MG/1; MG/1; MG/1; MG/1
20 TABLET ORAL DAILY
Qty: 30 TABLET | Refills: 0 | Status: SHIPPED | OUTPATIENT
Start: 2024-10-17 | End: 2024-11-05

## 2024-10-17 RX ORDER — DEXTROAMPHETAMINE SACCHARATE, AMPHETAMINE ASPARTATE, DEXTROAMPHETAMINE SULFATE AND AMPHETAMINE SULFATE 5; 5; 5; 5 MG/1; MG/1; MG/1; MG/1
20 TABLET ORAL DAILY
Qty: 30 TABLET | Refills: 0 | OUTPATIENT
Start: 2024-10-17 | End: 2024-11-16

## 2024-11-05 ENCOUNTER — LAB ENCOUNTER (OUTPATIENT)
Dept: LAB | Age: 29
End: 2024-11-05
Attending: FAMILY MEDICINE
Payer: COMMERCIAL

## 2024-11-05 ENCOUNTER — OFFICE VISIT (OUTPATIENT)
Dept: FAMILY MEDICINE CLINIC | Facility: CLINIC | Age: 29
End: 2024-11-05
Payer: COMMERCIAL

## 2024-11-05 VITALS
HEART RATE: 64 BPM | DIASTOLIC BLOOD PRESSURE: 62 MMHG | HEIGHT: 64 IN | BODY MASS INDEX: 20.14 KG/M2 | WEIGHT: 118 LBS | SYSTOLIC BLOOD PRESSURE: 100 MMHG | TEMPERATURE: 98 F

## 2024-11-05 DIAGNOSIS — R79.89 ABNORMAL TSH: Primary | ICD-10-CM

## 2024-11-05 DIAGNOSIS — R79.89 ABNORMAL TSH: ICD-10-CM

## 2024-11-05 DIAGNOSIS — Z71.6 ENCOUNTER FOR TOBACCO USE CESSATION COUNSELING: ICD-10-CM

## 2024-11-05 DIAGNOSIS — F90.0 ATTENTION DEFICIT HYPERACTIVITY DISORDER, PREDOMINANTLY INATTENTIVE TYPE: ICD-10-CM

## 2024-11-05 LAB
T4 FREE SERPL-MCNC: 1 NG/DL (ref 0.8–1.7)
TSI SER-ACNC: 0.32 UIU/ML (ref 0.55–4.78)

## 2024-11-05 PROCEDURE — 3074F SYST BP LT 130 MM HG: CPT | Performed by: FAMILY MEDICINE

## 2024-11-05 PROCEDURE — 84443 ASSAY THYROID STIM HORMONE: CPT

## 2024-11-05 PROCEDURE — 90656 IIV3 VACC NO PRSV 0.5 ML IM: CPT | Performed by: FAMILY MEDICINE

## 2024-11-05 PROCEDURE — 99214 OFFICE O/P EST MOD 30 MIN: CPT | Performed by: FAMILY MEDICINE

## 2024-11-05 PROCEDURE — 3008F BODY MASS INDEX DOCD: CPT | Performed by: FAMILY MEDICINE

## 2024-11-05 PROCEDURE — 90471 IMMUNIZATION ADMIN: CPT | Performed by: FAMILY MEDICINE

## 2024-11-05 PROCEDURE — 3078F DIAST BP <80 MM HG: CPT | Performed by: FAMILY MEDICINE

## 2024-11-05 PROCEDURE — 36415 COLL VENOUS BLD VENIPUNCTURE: CPT

## 2024-11-05 PROCEDURE — 84439 ASSAY OF FREE THYROXINE: CPT

## 2024-11-05 RX ORDER — DEXTROAMPHETAMINE SACCHARATE, AMPHETAMINE ASPARTATE MONOHYDRATE, DEXTROAMPHETAMINE SULFATE AND AMPHETAMINE SULFATE 2.5; 2.5; 2.5; 2.5 MG/1; MG/1; MG/1; MG/1
10 CAPSULE, EXTENDED RELEASE ORAL EVERY MORNING
COMMUNITY

## 2024-11-05 RX ORDER — DEXTROAMPHETAMINE SACCHARATE, AMPHETAMINE ASPARTATE, DEXTROAMPHETAMINE SULFATE AND AMPHETAMINE SULFATE 2.5; 2.5; 2.5; 2.5 MG/1; MG/1; MG/1; MG/1
10 TABLET ORAL DAILY
Qty: 30 TABLET | Refills: 0 | Status: SHIPPED | OUTPATIENT
Start: 2025-01-06 | End: 2025-02-05

## 2024-11-05 RX ORDER — DEXTROAMPHETAMINE SACCHARATE, AMPHETAMINE ASPARTATE, DEXTROAMPHETAMINE SULFATE AND AMPHETAMINE SULFATE 2.5; 2.5; 2.5; 2.5 MG/1; MG/1; MG/1; MG/1
10 TABLET ORAL DAILY
Qty: 30 TABLET | Refills: 0 | Status: SHIPPED | OUTPATIENT
Start: 2024-11-05 | End: 2024-12-05

## 2024-11-05 RX ORDER — DEXTROAMPHETAMINE SACCHARATE, AMPHETAMINE ASPARTATE, DEXTROAMPHETAMINE SULFATE AND AMPHETAMINE SULFATE 2.5; 2.5; 2.5; 2.5 MG/1; MG/1; MG/1; MG/1
10 TABLET ORAL DAILY
Qty: 30 TABLET | Refills: 0 | Status: SHIPPED | OUTPATIENT
Start: 2024-12-06 | End: 2025-01-05

## 2024-11-05 RX ORDER — DEXTROAMPHETAMINE SACCHARATE, AMPHETAMINE ASPARTATE MONOHYDRATE, DEXTROAMPHETAMINE SULFATE AND AMPHETAMINE SULFATE 2.5; 2.5; 2.5; 2.5 MG/1; MG/1; MG/1; MG/1
10 CAPSULE, EXTENDED RELEASE ORAL EVERY MORNING
Qty: 30 CAPSULE | Refills: 3 | Status: CANCELLED | OUTPATIENT
Start: 2024-11-05

## 2024-11-05 NOTE — PROGRESS NOTES
HPI: Ludy is a 29 year old female who presents for thyroid follow-up. Typically sees Dora. Had labs done 3 months ago and TSH was abnormal.  Mom had hyperthyroidism diagnosed at age 40.     Has ADHD. Takes 20mg Adderall daily and 10mg Adderall in the afternoons if needed. Tolerates well.     PMH:    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    ASC-H    Migraine    Ovarian cyst      Alg:  Patient has no known allergies.   Meds: Medications Ordered Prior to Encounter[1]   Tobacco Use: no    Objective:   Gen: AOx3. NAD.   /62 (BP Location: Right arm, Patient Position: Sitting, Cuff Size: adult)   Pulse 64   Temp 97.7 °F (36.5 °C) (Temporal)   Ht 5' 4\" (1.626 m)   Wt 118 lb (53.5 kg)   LMP 10/29/2024 (Exact Date)   BMI 20.25 kg/m²       Assessment:/Plan:  Encounter Diagnoses   Name Primary?    Abnormal TSH    Check TSH and T4.  Will call with results.    Yes    Encounter for tobacco use cessation counseling    Encouraged smoking cessation.        Attention deficit hyperactivity disorder, predominantly inattentive type    Tolerating 20mg Adderall per day and 10gm Adderall in the afternoon as needed. Refilled.         Colleen Weiler, DO         [1]   Current Outpatient Medications on File Prior to Visit   Medication Sig Dispense Refill    amphetamine-dextroamphetamine ER 10 MG Oral Capsule SR 24 Hr Take 1 capsule (10 mg total) by mouth every morning.      amphetamine-dextroamphetamine (ADDERALL) 20 MG Oral Tab Take 1 tablet (20 mg total) by mouth daily.       No current facility-administered medications on file prior to visit.

## 2024-11-25 ENCOUNTER — OFFICE VISIT (OUTPATIENT)
Dept: OBGYN CLINIC | Facility: CLINIC | Age: 29
End: 2024-11-25
Payer: COMMERCIAL

## 2024-11-25 VITALS
BODY MASS INDEX: 19.64 KG/M2 | HEIGHT: 65 IN | SYSTOLIC BLOOD PRESSURE: 108 MMHG | WEIGHT: 117.88 LBS | DIASTOLIC BLOOD PRESSURE: 66 MMHG

## 2024-11-25 DIAGNOSIS — D06.9 CIN III (CERVICAL INTRAEPITHELIAL NEOPLASIA GRADE III) WITH SEVERE DYSPLASIA: ICD-10-CM

## 2024-11-25 DIAGNOSIS — Z01.419 ENCOUNTER FOR GYNECOLOGICAL EXAMINATION WITHOUT ABNORMAL FINDING: Primary | ICD-10-CM

## 2024-11-25 PROCEDURE — 88175 CYTOPATH C/V AUTO FLUID REDO: CPT | Performed by: OBSTETRICS & GYNECOLOGY

## 2024-11-25 RX ORDER — COPPER 313.4 MG/1
1 INTRAUTERINE DEVICE INTRAUTERINE ONCE
COMMUNITY

## 2024-11-25 NOTE — PROGRESS NOTES
GYN H&P     2024  4:21 PM    CC: Patient is here for annual. Needs pap    HPI: Patient is a 29 year old  for annual.     Menses: sapphire paragard IUD with regular periods  Pelvic Pain: None  Vaginal discharge:None      Patient's last menstrual period was 10/29/2024 (exact date).    OB History    Para Term  AB Living   3 0 0 0 3 0   SAB IAB Ectopic Multiple Live Births   2 1 0 0        # Outcome Date GA Lbr Cesario/2nd Weight Sex Type Anes PTL Lv   3 IAB            2 SAB            1 SAB                GYN hx:    Hx Prior Abnormal Pap: Yes (ASC-H)  Pap Date: 22  Pap Result Notes: WNL  Follow Up Recommendation: Colposcopy 10/29/2019 abnormal (CIN2)-LEEP done 2019        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    ASC-H    Migraine    Ovarian cyst     Past Surgical History:   Procedure Laterality Date    Colposcopy, cervix w/upper adjacent vagina; w/biopsy(s), cervix  10/29/2019    Induced abortn by d&c      Insert intrauterine device  2019    Mirena IUD     Leep  2019    SARAHI iii with positive margins    Removal of ovarian cyst(s)  2021    Laparoscoic L ovarian cystectomy    Tonsillectomy       Allergies[1]  Family History   Problem Relation Age of Onset    Other (AAA) Father     Breast Cancer Maternal Grandmother     Melanoma Maternal Aunt      Social History     Socioeconomic History    Marital status: Single   Occupational History    Occupation: School nurse   Tobacco Use    Smoking status: Never    Smokeless tobacco: Current   Vaping Use    Vaping status: Every Day    Substances: Nicotine, CBD    Devices: Disposable, Pre-filled or refillable cartridge, Refillable tank, Pre-filled pod   Substance and Sexual Activity    Alcohol use: Yes     Comment: socially    Drug use: Yes     Types: Cannabis    Sexual activity: Yes     Partners: Male     Birth control/protection: Condom, I.U.D.     Social  History     Social History Narrative    Live with parents and brother    No h/o abuse       Medications reviewed. See active list.     /66   Ht 65\"   Wt 117 lb 14.4 oz (53.5 kg)   LMP 10/29/2024 (Exact Date)   BMI 19.62 kg/m²       Exam:   GENERAL: well developed, well nourished, in no apparent distress  SKIN: no rashes, no suspicious lesions  HEENT: normal  NECK: supple; no thyroidmegaly, no adenopathy  BREASTS: symmetrical, nontender, no palpable masses or nodes, no nipple discharge, no skin changes, no dippling, no palpable axillary adenopathy  ABDOMEN: Soft, non distended; non tender, no masses.  Liver and spleen non-tender, no enlargement. No palpable hernias  GYNE/:  External Genitalia: Normal appearing, no lesions, normal hair distribution   Urethral meatus appear wnl, no abnormal discharge or lesions noted.   Bladder: well supported, urethra wnl, no palpable tenderness or masses, no discharge  Vagina: normal pink mucosa, no lesions, normal clear discharge.   Uterus: midline, mobile, non-tender, firm and smooth  Cervix: pink, no lesions grossly visible, no discharge, IUD strings visibe.   Adnexa: non tender, no palpable masses, normal size  Anus:  No lesions or visible hemorrhoids        A/P: Patient is 29 year old female     1. Encounter for gynecological examination without abnormal finding    2. SARAHI III (cervical intraepithelial neoplasia grade III) with severe dysplasia      Kendra Butcher MD              [1] No Known Allergies

## 2025-01-28 RX ORDER — DEXTROAMPHETAMINE SACCHARATE, AMPHETAMINE ASPARTATE, DEXTROAMPHETAMINE SULFATE AND AMPHETAMINE SULFATE 5; 5; 5; 5 MG/1; MG/1; MG/1; MG/1
20 TABLET ORAL DAILY
Refills: 0 | OUTPATIENT
Start: 2025-01-28

## 2025-01-28 RX ORDER — DEXTROAMPHETAMINE SACCHARATE, AMPHETAMINE ASPARTATE, DEXTROAMPHETAMINE SULFATE AND AMPHETAMINE SULFATE 5; 5; 5; 5 MG/1; MG/1; MG/1; MG/1
20 TABLET ORAL DAILY
Qty: 30 TABLET | Refills: 0 | OUTPATIENT
Start: 2025-01-28

## 2025-02-13 DIAGNOSIS — F90.0 ATTENTION DEFICIT HYPERACTIVITY DISORDER, PREDOMINANTLY INATTENTIVE TYPE: ICD-10-CM

## 2025-02-13 DIAGNOSIS — F90.9 ATTENTION DEFICIT HYPERACTIVITY DISORDER (ADHD), UNSPECIFIED ADHD TYPE: ICD-10-CM

## 2025-02-17 RX ORDER — DEXTROAMPHETAMINE SACCHARATE, AMPHETAMINE ASPARTATE, DEXTROAMPHETAMINE SULFATE AND AMPHETAMINE SULFATE 2.5; 2.5; 2.5; 2.5 MG/1; MG/1; MG/1; MG/1
10 TABLET ORAL DAILY
Qty: 30 TABLET | Refills: 0 | OUTPATIENT
Start: 2025-02-26 | End: 2025-03-28

## 2025-02-17 RX ORDER — DEXTROAMPHETAMINE SACCHARATE, AMPHETAMINE ASPARTATE, DEXTROAMPHETAMINE SULFATE AND AMPHETAMINE SULFATE 2.5; 2.5; 2.5; 2.5 MG/1; MG/1; MG/1; MG/1
10 TABLET ORAL DAILY
Qty: 30 TABLET | Refills: 0 | Status: SHIPPED | OUTPATIENT
Start: 2025-04-20 | End: 2025-05-20

## 2025-02-17 RX ORDER — DEXTROAMPHETAMINE SACCHARATE, AMPHETAMINE ASPARTATE, DEXTROAMPHETAMINE SULFATE AND AMPHETAMINE SULFATE 2.5; 2.5; 2.5; 2.5 MG/1; MG/1; MG/1; MG/1
10 TABLET ORAL DAILY
Qty: 30 TABLET | Refills: 0 | Status: SHIPPED | OUTPATIENT
Start: 2025-02-17 | End: 2025-03-19

## 2025-02-17 RX ORDER — DEXTROAMPHETAMINE SACCHARATE, AMPHETAMINE ASPARTATE, DEXTROAMPHETAMINE SULFATE AND AMPHETAMINE SULFATE 2.5; 2.5; 2.5; 2.5 MG/1; MG/1; MG/1; MG/1
10 TABLET ORAL DAILY
Qty: 30 TABLET | Refills: 0 | Status: SHIPPED | OUTPATIENT
Start: 2025-03-20 | End: 2025-04-19

## 2025-02-17 NOTE — TELEPHONE ENCOUNTER
Please review.   No protocol.    Recent fills each # 1/8/25 : 12/6/24  Last prescription written: 11/5/24  Last office visit:  11/5/2024    Future Appointments   Date Time Provider Department Center   2/26/2025  3:00 PM Dora Grey APRN [facility]

## 2025-02-26 ENCOUNTER — OFFICE VISIT (OUTPATIENT)
Dept: FAMILY MEDICINE CLINIC | Facility: CLINIC | Age: 30
End: 2025-02-26
Payer: COMMERCIAL

## 2025-02-26 ENCOUNTER — LAB ENCOUNTER (OUTPATIENT)
Dept: LAB | Age: 30
End: 2025-02-26
Payer: COMMERCIAL

## 2025-02-26 VITALS — SYSTOLIC BLOOD PRESSURE: 120 MMHG | DIASTOLIC BLOOD PRESSURE: 80 MMHG

## 2025-02-26 DIAGNOSIS — Z71.6 ENCOUNTER FOR TOBACCO USE CESSATION COUNSELING: ICD-10-CM

## 2025-02-26 DIAGNOSIS — R79.9 ABNORMAL BLOOD CHEMISTRY: ICD-10-CM

## 2025-02-26 DIAGNOSIS — R79.89 ABNORMAL TSH: Primary | ICD-10-CM

## 2025-02-26 DIAGNOSIS — F90.9 ATTENTION DEFICIT HYPERACTIVITY DISORDER (ADHD), UNSPECIFIED ADHD TYPE: ICD-10-CM

## 2025-02-26 DIAGNOSIS — R79.89 ABNORMAL TSH: ICD-10-CM

## 2025-02-26 LAB
DEPRECATED RDW RBC AUTO: 41.7 FL (ref 35.1–46.3)
ERYTHROCYTE [DISTWIDTH] IN BLOOD BY AUTOMATED COUNT: 11.9 % (ref 11–15)
HCT VFR BLD AUTO: 39.9 %
HGB BLD-MCNC: 13.3 G/DL
MCH RBC QN AUTO: 31.5 PG (ref 26–34)
MCHC RBC AUTO-ENTMCNC: 33.3 G/DL (ref 31–37)
MCV RBC AUTO: 94.5 FL
PLATELET # BLD AUTO: 241 10(3)UL (ref 150–450)
RBC # BLD AUTO: 4.22 X10(6)UL
TSI SER-ACNC: 0.74 UIU/ML (ref 0.55–4.78)
WBC # BLD AUTO: 5.9 X10(3) UL (ref 4–11)

## 2025-02-26 PROCEDURE — 85027 COMPLETE CBC AUTOMATED: CPT

## 2025-02-26 PROCEDURE — 36415 COLL VENOUS BLD VENIPUNCTURE: CPT

## 2025-02-26 PROCEDURE — 84443 ASSAY THYROID STIM HORMONE: CPT

## 2025-02-26 PROCEDURE — 3074F SYST BP LT 130 MM HG: CPT

## 2025-02-26 PROCEDURE — 99406 BEHAV CHNG SMOKING 3-10 MIN: CPT

## 2025-02-26 PROCEDURE — 99213 OFFICE O/P EST LOW 20 MIN: CPT

## 2025-02-26 PROCEDURE — 3079F DIAST BP 80-89 MM HG: CPT

## 2025-02-26 NOTE — PROGRESS NOTES
Ludy Lew is a 29 year old female.  Chief Complaint   Patient presents with    Follow - Up     Here to follow up on low TSH from 11/2024.     HPI:   Ludy Lew presented to the clinic for follow up abnormal TSH. Mother with HX of hyperthyroidism. Diagnosed age 40. Denies symptoms today.     HX ADHD, takes adderall 20mg daily, 10mg if needed in the afternoon. Tolerating well. Denies side effects.     Current Outpatient Medications   Medication Sig Dispense Refill    [START ON 4/20/2025] amphetamine-dextroamphetamine (ADDERALL) 10 MG Oral Tab Take 1 tablet (10 mg total) by mouth daily. 30 tablet 0    amphetamine-dextroamphetamine (ADDERALL) 20 MG Oral Tab Take 1 tablet (20 mg total) by mouth daily. 30 tablet 0    intrauterine copper contraceptive Intrauterine IUD 1 Intra Uterine Device by Intrauterine route once.        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    ASC-H    Migraine    Ovarian cyst      Past Surgical History:   Procedure Laterality Date    Colposcopy, cervix w/upper adjacent vagina; w/biopsy(s), cervix  10/29/2019    Induced abortn by d&c  2023    Insert intrauterine device  12/2019    Mirena IUD     Leep  11/2019    SARAHI iii with positive margins    Removal of ovarian cyst(s)  02/2021    Laparoscoic L ovarian cystectomy    Tonsillectomy        Social History:  Social History     Socioeconomic History    Marital status: Single   Occupational History    Occupation: School nurse   Tobacco Use    Smoking status: Never    Smokeless tobacco: Current   Vaping Use    Vaping status: Every Day    Substances: Nicotine, CBD    Devices: Disposable, Pre-filled or refillable cartridge, Refillable tank, Pre-filled pod   Substance and Sexual Activity    Alcohol use: Yes     Comment: socially    Drug use: Yes     Types: Cannabis    Sexual activity: Yes     Partners: Male     Birth control/protection: Condom, I.U.D.   Other  Topics Concern    Blood Transfusions No   Social History Narrative    Live with parents and brother    No h/o abuse      Family History   Problem Relation Age of Onset    Other (AAA) Father     Breast Cancer Maternal Grandmother     Melanoma Maternal Aunt       Allergies[1]     REVIEW OF SYSTEMS:   Review of Systems   Constitutional:  Negative for activity change, fatigue and fever.   Respiratory:  Negative for chest tightness and shortness of breath.    Cardiovascular:  Negative for chest pain and palpitations.   Neurological: Negative.    Psychiatric/Behavioral: Negative.     All other systems reviewed and are negative.     Wt Readings from Last 5 Encounters:   11/25/24 117 lb 14.4 oz (53.5 kg)   11/05/24 118 lb (53.5 kg)   07/15/24 117 lb (53.1 kg)   03/05/24 117 lb (53.1 kg)   11/13/23 121 lb (54.9 kg)     There is no height or weight on file to calculate BMI.      EXAM:   [facility] 10/29/2024 (Exact Date)   Physical Exam  Vitals reviewed.   Constitutional:       Appearance: Normal appearance.   HENT:      Head: Normocephalic and atraumatic.   Cardiovascular:      Rate and Rhythm: Normal rate and regular rhythm.      Pulses: Normal pulses.      Heart sounds: Normal heart sounds.   Pulmonary:      Effort: Pulmonary effort is normal.      Breath sounds: Normal breath sounds.   Neurological:      General: No focal deficit present.      Mental Status: She is alert and oriented to person, place, and time.   Psychiatric:         Mood and Affect: Mood normal.         Behavior: Behavior normal.            ASSESSMENT AND PLAN:   (R79.89) Abnormal TSH  (primary encounter diagnosis)  (R79.9) Abnormal blood chemistry  Plan: labs completed today. Will monitor. Further management pending results.     (Z71.6) Encounter for tobacco use cessation counseling  Plan: not interested in cessation tools at this time.     (F90.9) Attention deficit hyperactivity disorder (ADHD), unspecified ADHD type  Plan: well managed. Continue current  management.     Follow up in 6 months or sooner if needed     The patient indicates understanding of these issues and agrees to the plan.  Chaperone offered to the patient prior to examination    This note was prepared using Dragon Medical voice recognition dictation software. As a result errors may occur. When identified these errors have been corrected. While every attempt is made to correct errors during dictation discrepancies may still exist.Tobacco cessation counseling for 3-10 minutes (add E/M code #18288).       [1] No Known Allergies

## 2025-02-28 DIAGNOSIS — F90.9 ATTENTION DEFICIT HYPERACTIVITY DISORDER (ADHD), UNSPECIFIED ADHD TYPE: ICD-10-CM

## 2025-03-03 ENCOUNTER — OFFICE VISIT (OUTPATIENT)
Dept: OBGYN CLINIC | Facility: CLINIC | Age: 30
End: 2025-03-03
Payer: COMMERCIAL

## 2025-03-03 VITALS
WEIGHT: 114.69 LBS | SYSTOLIC BLOOD PRESSURE: 112 MMHG | DIASTOLIC BLOOD PRESSURE: 70 MMHG | BODY MASS INDEX: 19.11 KG/M2 | HEIGHT: 65 IN

## 2025-03-03 DIAGNOSIS — Z30.432 ENCOUNTER FOR REMOVAL OF INTRAUTERINE CONTRACEPTIVE DEVICE: ICD-10-CM

## 2025-03-03 DIAGNOSIS — R10.32 ABDOMINAL PAIN, LEFT LOWER QUADRANT: Primary | ICD-10-CM

## 2025-03-03 PROBLEM — D06.9 CIN III (CERVICAL INTRAEPITHELIAL NEOPLASIA GRADE III) WITH SEVERE DYSPLASIA: Status: RESOLVED | Noted: 2019-12-03 | Resolved: 2025-03-03

## 2025-03-03 LAB
APPEARANCE: CLEAR
BILIRUBIN: NEGATIVE
CONTROL LINE PRESENT WITH A CLEAR BACKGROUND (YES/NO): YES YES/NO
GLUCOSE (URINE DIPSTICK): NEGATIVE MG/DL
KETONES (URINE DIPSTICK): NEGATIVE MG/DL
KIT LOT #: NORMAL NUMERIC
LEUKOCYTES: NEGATIVE
MULTISTIX LOT#: NORMAL NUMERIC
NITRITE, URINE: NEGATIVE
OCCULT BLOOD: NEGATIVE
PH, URINE: 7.5 (ref 4.5–8)
PREGNANCY TEST, URINE: NEGATIVE
PROTEIN (URINE DIPSTICK): NEGATIVE MG/DL
SPECIFIC GRAVITY: 1.01 (ref 1–1.03)
URINE-COLOR: YELLOW
UROBILINOGEN,SEMI-QN: 0.2 MG/DL (ref 0–1.9)

## 2025-03-03 PROCEDURE — 87086 URINE CULTURE/COLONY COUNT: CPT | Performed by: OBSTETRICS & GYNECOLOGY

## 2025-03-03 NOTE — PROGRESS NOTES
CC: Patient is here for LLQ pain.    HPI: Patient is a 29 year old  for has been having 1 week hx  LLQ pain described as coming in waves, \"like a ripping pain\", worse with excessive activity, cough with a popping sensation, hurts with urination. + c/o initial nausea with pain. No diarrhea or constipation. No fever or chills. No blood in urine. No urinary frequency. No vaginal discharge. She feels like this is similar to when she had ovarian cyst. Some pain with sex. She is in monogamous relationship. No vaginal discharge.     She has had paragard IUD in for over 2 years    Menses: 1 x per month, some heavy bleeding.     She would like her IUD removed and plans to use condoms.     Patient's last menstrual period was 2025 (approximate).    OB History    Para Term  AB Living   3 0 0 0 3 0   SAB IAB Ectopic Multiple Live Births   2 1 0 0        # Outcome Date GA Lbr Cesario/2nd Weight Sex Type Anes PTL Lv   3 IAB            2 SAB            1 SAB                GYN hx:       LPS:        Past Medical History:    Depression    Migraine     Past Surgical History:   Procedure Laterality Date    Colposcopy, cervix w/upper adjacent vagina; w/biopsy(s), cervix  10/29/2019    Induced abortn by d&c      Leep  2019    SARAHI iii with positive margins    Removal of ovarian cyst(s)  2021    Laparoscoic L ovarian cystectomy    Tonsillectomy       Allergies[1]  Family History   Problem Relation Age of Onset    Other (AAA) Father     Breast Cancer Maternal Grandmother     Melanoma Maternal Aunt      Social History     Socioeconomic History    Marital status: Single     Spouse name: Not on file    Number of children: Not on file    Years of education: Not on file    Highest education level: Not on file   Occupational History    Occupation: School nurse   Tobacco Use    Smoking status: Never    Smokeless tobacco: Current   Vaping Use    Vaping status: Every Day    Substances: Nicotine, CBD    Devices:  Disposable, Pre-filled or refillable cartridge, Refillable tank, Pre-filled pod   Substance and Sexual Activity    Alcohol use: Yes     Comment: socially    Drug use: Yes     Types: Cannabis    Sexual activity: Yes     Partners: Male     Birth control/protection: Condom, I.U.D.   Other Topics Concern     Service Not Asked    Blood Transfusions No    Caffeine Concern Not Asked    Occupational Exposure Not Asked    Hobby Hazards Not Asked    Sleep Concern Not Asked    Stress Concern Not Asked    Weight Concern Not Asked    Special Diet Not Asked    Back Care Not Asked    Exercise Not Asked    Bike Helmet Not Asked    Seat Belt Not Asked    Self-Exams Not Asked   Social History Narrative    Live with parents and brother    No h/o abuse     Social Drivers of Health     Food Insecurity: Not on file   Transportation Needs: Not on file   Stress: Not on file   Housing Stability: Not on file       Medications reviewed. See active list.     /70   Ht 65\"   Wt 114 lb 11.2 oz (52 kg)   LMP 02/02/2025 (Approximate)   BMI 19.09 kg/m²       Exam:   GENERAL: well developed, well nourished, in no apparent distress  ABDOMEN: Soft, non distended; non tender, no masses.  Liver and spleen non-tender, no enlargement. No palpable hernias  GYNE/:  External Genitalia: Normal appearing, no lesions, normal hair distribution   Urethral meatus appear wnl, no abnormal discharge or lesions noted.   Bladder: well supported, urethra wnl, no palpable tenderness or masses, no discharge  Vagina: normal pink mucosa, no lesions, normal clear discharge.   Uterus: midline, mobile, non-tender, firm and smooth  Cervix: pink, no lesions grossly visible, no discharge  Adnexa: non tender, no palpable masses, normal size  Anus:  No lesions or visible hemorrhoids    IUD string grasped and removed.     A/P: Patient is 29 year old female     1. Abdominal pain, left lower quadrant  - Urine Dip in office [24312]  - Urine Culture, Routine;  Future  - Urine Preg Test [03200]  - Urine Culture, Routine  - Transvaginal US GYNE Only [38972]; Future    2. Encounter for removal of intrauterine contraceptive device  - Removal of IUD [44900]      Kendra Butcher MD                [1] No Known Allergies

## 2025-03-04 ENCOUNTER — ULTRASOUND ENCOUNTER (OUTPATIENT)
Dept: OBGYN CLINIC | Facility: CLINIC | Age: 30
End: 2025-03-04
Payer: COMMERCIAL

## 2025-03-04 DIAGNOSIS — R10.32 LLQ PAIN: Primary | ICD-10-CM

## 2025-03-04 PROCEDURE — 76830 TRANSVAGINAL US NON-OB: CPT | Performed by: OBSTETRICS & GYNECOLOGY

## 2025-03-04 PROCEDURE — 76856 US EXAM PELVIC COMPLETE: CPT | Performed by: OBSTETRICS & GYNECOLOGY

## 2025-03-04 NOTE — TELEPHONE ENCOUNTER
Please review; protocol failed/No Protocol      Recent Fills: 11/21/2024, 12/22/2024, 01/29/2025    Last Rx Written: 01/29/2025    Last Office Visit: 02/26/2025

## 2025-03-05 RX ORDER — DEXTROAMPHETAMINE SACCHARATE, AMPHETAMINE ASPARTATE, DEXTROAMPHETAMINE SULFATE AND AMPHETAMINE SULFATE 5; 5; 5; 5 MG/1; MG/1; MG/1; MG/1
20 TABLET ORAL DAILY
Qty: 30 TABLET | Refills: 0 | Status: SHIPPED | OUTPATIENT
Start: 2025-03-05

## 2025-04-03 DIAGNOSIS — F90.9 ATTENTION DEFICIT HYPERACTIVITY DISORDER (ADHD), UNSPECIFIED ADHD TYPE: ICD-10-CM

## 2025-04-07 RX ORDER — DEXTROAMPHETAMINE SACCHARATE, AMPHETAMINE ASPARTATE, DEXTROAMPHETAMINE SULFATE AND AMPHETAMINE SULFATE 5; 5; 5; 5 MG/1; MG/1; MG/1; MG/1
20 TABLET ORAL DAILY
Qty: 30 TABLET | Refills: 0 | Status: SHIPPED | OUTPATIENT
Start: 2025-04-07

## 2025-04-07 NOTE — TELEPHONE ENCOUNTER
Please review; protocol failed/ has no protocol      Recent fills: 03/05/2025,01/29/2025,12/22/2024  Last Rx written: 03/05/2025  Last Office Visit: 02/26/2025    Recent Visits  Date Type Provider Dept   02/26/25 Office Visit Dora Grey APRN [facility]

## 2025-05-12 DIAGNOSIS — F90.0 ATTENTION DEFICIT HYPERACTIVITY DISORDER, PREDOMINANTLY INATTENTIVE TYPE: ICD-10-CM

## 2025-05-12 DIAGNOSIS — F90.9 ATTENTION DEFICIT HYPERACTIVITY DISORDER (ADHD), UNSPECIFIED ADHD TYPE: ICD-10-CM

## 2025-05-12 RX ORDER — DEXTROAMPHETAMINE SACCHARATE, AMPHETAMINE ASPARTATE, DEXTROAMPHETAMINE SULFATE AND AMPHETAMINE SULFATE 5; 5; 5; 5 MG/1; MG/1; MG/1; MG/1
20 TABLET ORAL DAILY
Qty: 30 TABLET | Refills: 0 | Status: SHIPPED | OUTPATIENT
Start: 2025-05-12

## 2025-05-12 RX ORDER — DEXTROAMPHETAMINE SACCHARATE, AMPHETAMINE ASPARTATE, DEXTROAMPHETAMINE SULFATE AND AMPHETAMINE SULFATE 2.5; 2.5; 2.5; 2.5 MG/1; MG/1; MG/1; MG/1
10 TABLET ORAL DAILY
Qty: 30 TABLET | Refills: 0 | Status: SHIPPED | OUTPATIENT
Start: 2025-05-12 | End: 2025-06-11

## 2025-05-12 NOTE — TELEPHONE ENCOUNTER
Please review. Refill failed protocol.     Adderall 10 MG:  Recent fills each # 30 : 4/27/25 , 3/24/25 , 2/17/25  Last prescription written: 2/17/25  Last office visit:  11/5/2024    Adderall 20 MG:  Recent fills each # 30 : 4/7/25 , 3/5/25 , 1/29/25  Last prescription written: 4/7/25  Last office visit:  11/5/2024  Future Appointments   Date Time Provider Department Center   6/18/2025  3:15 PM Weiler, Colleen M, DO [facility]      Sent [employer] message to patient the refill request was forwarded to provider.

## 2025-06-18 ENCOUNTER — OFFICE VISIT (OUTPATIENT)
Dept: FAMILY MEDICINE CLINIC | Facility: CLINIC | Age: 30
End: 2025-06-18
Payer: COMMERCIAL

## 2025-06-18 VITALS
RESPIRATION RATE: 19 BRPM | HEART RATE: 98 BPM | OXYGEN SATURATION: 97 % | BODY MASS INDEX: 20 KG/M2 | WEIGHT: 118 LBS | DIASTOLIC BLOOD PRESSURE: 83 MMHG | SYSTOLIC BLOOD PRESSURE: 130 MMHG

## 2025-06-18 DIAGNOSIS — L98.9 SKIN LESION: Primary | ICD-10-CM

## 2025-06-18 DIAGNOSIS — F90.9 ATTENTION DEFICIT HYPERACTIVITY DISORDER (ADHD), UNSPECIFIED ADHD TYPE: ICD-10-CM

## 2025-06-18 PROCEDURE — 99214 OFFICE O/P EST MOD 30 MIN: CPT | Performed by: FAMILY MEDICINE

## 2025-06-18 PROCEDURE — 3075F SYST BP GE 130 - 139MM HG: CPT | Performed by: FAMILY MEDICINE

## 2025-06-18 PROCEDURE — 3079F DIAST BP 80-89 MM HG: CPT | Performed by: FAMILY MEDICINE

## 2025-06-18 PROCEDURE — G2211 COMPLEX E/M VISIT ADD ON: HCPCS | Performed by: FAMILY MEDICINE

## 2025-06-18 RX ORDER — DEXTROAMPHETAMINE SACCHARATE, AMPHETAMINE ASPARTATE, DEXTROAMPHETAMINE SULFATE AND AMPHETAMINE SULFATE 5; 5; 5; 5 MG/1; MG/1; MG/1; MG/1
20 TABLET ORAL DAILY
Qty: 30 TABLET | Refills: 0 | Status: CANCELLED | OUTPATIENT
Start: 2025-06-18

## 2025-06-18 RX ORDER — DEXTROAMPHETAMINE SACCHARATE, AMPHETAMINE ASPARTATE, DEXTROAMPHETAMINE SULFATE AND AMPHETAMINE SULFATE 5; 5; 5; 5 MG/1; MG/1; MG/1; MG/1
20 TABLET ORAL DAILY
Qty: 30 TABLET | Refills: 0 | Status: SHIPPED | OUTPATIENT
Start: 2025-06-18 | End: 2025-07-18

## 2025-06-18 RX ORDER — DEXTROAMPHETAMINE SACCHARATE, AMPHETAMINE ASPARTATE, DEXTROAMPHETAMINE SULFATE AND AMPHETAMINE SULFATE 2.5; 2.5; 2.5; 2.5 MG/1; MG/1; MG/1; MG/1
TABLET ORAL
COMMUNITY
Start: 2025-06-05

## 2025-06-18 RX ORDER — DEXTROAMPHETAMINE SACCHARATE, AMPHETAMINE ASPARTATE, DEXTROAMPHETAMINE SULFATE AND AMPHETAMINE SULFATE 5; 5; 5; 5 MG/1; MG/1; MG/1; MG/1
20 TABLET ORAL DAILY
Qty: 30 TABLET | Refills: 0 | Status: SHIPPED | OUTPATIENT
Start: 2025-08-19 | End: 2025-09-18

## 2025-06-18 RX ORDER — DEXTROAMPHETAMINE SACCHARATE, AMPHETAMINE ASPARTATE, DEXTROAMPHETAMINE SULFATE AND AMPHETAMINE SULFATE 5; 5; 5; 5 MG/1; MG/1; MG/1; MG/1
20 TABLET ORAL DAILY
Qty: 30 TABLET | Refills: 0 | Status: SHIPPED | OUTPATIENT
Start: 2025-07-19 | End: 2025-08-18

## 2025-06-18 NOTE — H&P
The following individual(s) verbally consented to be recorded using ambient AI listening technology and understand that they can each withdraw their consent to this listening technology at any point by asking the clinician to turn off or pause the recording:    Patient name: Ludy Lew  Additional names:

## 2025-06-18 NOTE — PROGRESS NOTES
Subjective:   Ludy Lew is a 29 year old female who presents for Follow - Up (presented to the clinic for follow-up ADHD.  Patient taking Adderall 20 mg daily and 10 mg Adderall as needed ) and Derm Problem (Pt c/o dark spot on R leg denies pain or itch [id number])       History/Other:   History of Present Illness  Ludy Lew is a 29 year old female who presents for medication management and evaluation of a skin lesion.    She is currently taking short-acting Adderall 20 mg with a 10 mg booster at noon for Attention Deficit Hyperactivity Disorder, predominantly inattentive type. Her medication regimen is effective without any problems. No chest pain, shortness of breath, palpitations, or changes in appetite. The pharmacy fills her prescription without issues.    She noticed a spot behind her right knee, initially red in color, which has since changed to a brownish color. She first noticed it in February and documented it with pictures in March. The spot has not been raised, uncomfortable, or bleeding. It has lightened in color recently but was initially small with a red center and then became slightly larger. She does not recall any injury to the area.   Chief Complaint Reviewed and Verified  Nursing Notes Reviewed and   Verified  Allergies Reviewed  Medications Reviewed  OB Status Reviewed           Tobacco:  Tobacco Use[1]  E-Cigarettes/Vaping       Questions Responses    E-Cigarette Use Current Every Day User          E-Cigarette/Vaping Substances       Questions Responses    Nicotine Yes    THC No    CBD Yes    Flavoring No          E-Cigarette/Vaping Devices       Questions Responses    Disposable Yes    Pre-filled or Refillable Cartridge Yes    Refillable Tank Yes    Pre-filled Pod Yes              Current Medications[2]    PHQ-2 SCORE: 0  , done 6/18/2025   If you checked off any problems, how difficult have these problems made it for you to do your work, take care of things at home,  or get along with other people?: Not difficult at all    Last [place] Suicide Screening on 6/18/2025 was No Risk.       Review of Systems:  See HPI    Objective:   /83 (BP Location: Right arm, Patient Position: Sitting, Cuff Size: adult)   Pulse 98   Resp 19   Wt 118 lb (53.5 kg)   LMP 06/04/2025 (Approximate)   SpO2 97%   BMI 19.64 kg/m²  Estimated body mass index is 19.64 kg/m² as calculated from the following:    Height as of 3/3/25: 5' 5\" (1.651 m).    Weight as of this encounter: 118 lb (53.5 kg).  Results         Physical Exam  SKIN: Hyperpigmented macule behind knee, brownish, not raised, not changing, not bleeding. Flat  CV:  Regular rate and rhythm; no murmurs  Lungs:  Clear to ausculation; good aeration               No wheezes, rales or rhonchi      Assessment & Plan:   1. Skin lesion (Primary)  2. Attention deficit hyperactivity disorder (ADHD), unspecified ADHD type  -     Amphetamine-Dextroamphetamine; Take 1 tablet (20 mg total) by mouth daily.  Dispense: 30 tablet; Refill: 0  -     Amphetamine-Dextroamphetamine; Take 1 tablet (20 mg total) by mouth daily.  Dispense: 30 tablet; Refill: 0  -     Amphetamine-Dextroamphetamine; Take 1 tablet (20 mg total) by mouth daily.  Dispense: 30 tablet; Refill: 0    Assessment & Plan  Attention deficit hyperactivity disorder, predominantly inattentive type  She is currently on amphetamine-dextroamphetamine (Adderall) 20 mg and a 10 mg booster for ADHD. She reports no adverse effects, including chest pain, dyspnea, palpitations, or appetite changes. Her blood pressure and heart rate are well-controlled. The pharmacy fills her prescriptions without issues. The decision to continue the current medication regimen is based on her well-managed condition and absence of adverse effects.  - Send three-month prescription for amphetamine-dextroamphetamine 20 mg to [facility].  - Instruct her to remind provider to send a three-month prescription for the 10 mg  booster when due.    Skin lesion  She has a skin lesion behind her knee, initially noticed in February and documented in March. It was initially erythematous and has since changed to a brownish color. The lesion is not raised, bleeding, or uncomfortable, and it has lightened in color recently. The differential includes a blood blister or similar lesion, which may take time to resolve. The plan is to monitor the lesion for changes, as it is expected to lighten and resolve over the next month or two. If it does not improve, a dermatology referral will be considered.  - Monitor the lesion for another month or two to see if it resolves.  - If the lesion does not improve, consider referral to a dermatologist.        No follow-ups on file.        Colleen Weiler, DO, 6/18/2025, 3:31 PM           [1]   Social History  Tobacco Use   Smoking Status Never   Smokeless Tobacco Current   [2]   Current Outpatient Medications   Medication Sig Dispense Refill    amphetamine-dextroamphetamine 10 MG Oral Tab       amphetamine-dextroamphetamine (ADDERALL) 20 MG Oral Tab Take 1 tablet (20 mg total) by mouth daily. 30 tablet 0    [START ON 7/19/2025] amphetamine-dextroamphetamine (ADDERALL) 20 MG Oral Tab Take 1 tablet (20 mg total) by mouth daily. 30 tablet 0    [START ON 8/19/2025] amphetamine-dextroamphetamine (ADDERALL) 20 MG Oral Tab Take 1 tablet (20 mg total) by mouth daily. 30 tablet 0    amphetamine-dextroamphetamine (ADDERALL) 20 MG Oral Tab Take 1 tablet (20 mg total) by mouth daily. 30 tablet 0

## 2025-07-22 DIAGNOSIS — F90.9 ATTENTION DEFICIT HYPERACTIVITY DISORDER (ADHD), UNSPECIFIED ADHD TYPE: ICD-10-CM

## 2025-07-24 RX ORDER — DEXTROAMPHETAMINE SACCHARATE, AMPHETAMINE ASPARTATE, DEXTROAMPHETAMINE SULFATE AND AMPHETAMINE SULFATE 2.5; 2.5; 2.5; 2.5 MG/1; MG/1; MG/1; MG/1
10 TABLET ORAL DAILY
Qty: 30 TABLET | Refills: 0 | Status: SHIPPED | OUTPATIENT
Start: 2025-07-24

## 2025-07-24 RX ORDER — DEXTROAMPHETAMINE SACCHARATE, AMPHETAMINE ASPARTATE, DEXTROAMPHETAMINE SULFATE AND AMPHETAMINE SULFATE 5; 5; 5; 5 MG/1; MG/1; MG/1; MG/1
20 TABLET ORAL DAILY
Qty: 30 TABLET | Refills: 0 | Status: SHIPPED | OUTPATIENT
Start: 2025-07-24 | End: 2025-08-23

## 2025-08-26 ENCOUNTER — E-VISIT (OUTPATIENT)
Dept: TELEHEALTH | Age: 30
End: 2025-08-26

## 2025-08-26 DIAGNOSIS — N89.8 WHITE VAGINAL DISCHARGE: ICD-10-CM

## 2025-08-26 DIAGNOSIS — N76.0 ACUTE VAGINITIS: Primary | ICD-10-CM

## 2025-08-26 PROCEDURE — 99421 OL DIG E/M SVC 5-10 MIN: CPT | Performed by: PHYSICIAN ASSISTANT

## 2025-08-26 RX ORDER — FLUCONAZOLE 150 MG/1
150 TABLET ORAL ONCE
Qty: 1 TABLET | Refills: 0 | Status: SHIPPED | OUTPATIENT
Start: 2025-08-26 | End: 2025-08-26

## (undated) DEVICE — [HIGH FLOW INSUFFLATOR,  DO NOT USE IF PACKAGE IS DAMAGED,  KEEP DRY,  KEEP AWAY FROM SUNLIGHT,  PROTECT FROM HEAT AND RADIOACTIVE SOURCES.]: Brand: PNEUMOSURE

## (undated) DEVICE — DISPOSABLE SUCTION/IRRIGATOR TUBE SET: Brand: AHTO

## (undated) DEVICE — TROCAR: Brand: KII SLEEVE

## (undated) DEVICE — TROCAR: Brand: KII FIOS FIRST ENTRY

## (undated) DEVICE — SOL  .9 1000ML BTL

## (undated) DEVICE — LAPAROSCOPY: Brand: MEDLINE INDUSTRIES, INC.

## (undated) DEVICE — UNDYED BRAIDED (POLYGLACTIN 910), SYNTHETIC ABSORBABLE SUTURE: Brand: COATED VICRYL

## (undated) DEVICE — Device

## (undated) DEVICE — LIGASURE LAP MARYLAND 37CM

## (undated) DEVICE — SOL  .9 3000ML

## (undated) NOTE — LETTER
Ludy Stuart, :10/29/1995    CONSENT FOR PROCEDURE/SEDATION    1. I authorize the performance upon Pastora Randolph  the following: LEEP with Biopsy  2.  I authorize Dr. Tiki Ku MD (and whomever is designated as the docto Relationship to patient: ____________________________________________    Witness: _________________________________________ Date:___________     Physician Signature: _______________________________ Date:___________

## (undated) NOTE — MR AVS SNAPSHOT
After Visit Summary   10/22/2019    Darcy Perez    MRN: [id number]           Visit Information     Date & Time  10/22/2019  3:00 PM Provider  Turner Teran MD [address] [place] [address] will help us do so. For more information on [employer], please visit [email or web address]                   DO YOU KNOW WHERE TO GO? Injury & illness are never convenient.  If you are dealing with a   non-emergency, consider your o

## (undated) NOTE — MR AVS SNAPSHOT
[address] at [place] [address]  [phone]               Thank you for choosing us for your health care visit with Marry Partida MD.  We are glad to serve you and happy to erika These medications were sent to [address], [phone], Larisa Linn [facility], [address]     Phone:  [phone]    - MedroxyPROGESTERone Acetate 10 MG Tabs

## (undated) NOTE — MR AVS SNAPSHOT
After Visit Summary   12/6/2021    Mehreen Hunter   MRN: [id number]           Visit Information     Date & Time  12/6/2021 11:30 AM Provider  Evelyn Restrepo MD [address] [place] [address] today! If you receive a survey from [employer], please take a few minutes to complete it and provide feedback. We strive to deliver the best patient experience and are looking for ways to make improvements. Your feedback will help us do so.  For mor

## (undated) NOTE — LETTER
Ludy Lew, :10/29/1995    CONSENT FOR PROCEDURE/SEDATION    1. I authorize the performance upon Ludy Lew  the following: IUD Removal     2. I authorize Dr. Kendra Butcher MD (and whomever is designated as the doctor’s assistant), to perform the above-mentioned procedures.    3. If any unforeseen conditions arise during this procedure calling for additional  procedures, operations, or medications (including anesthesia and blood transfusion), I further request and authorize the doctor to do whatever he/she deems advisable in my interest.    4. I consent to the taking and reproduction of any photographs in the course of this procedure for professional purposes.    5. I consent to the administration of such sedation as may be considered necessary or advisable by the physician responsible for this service, with the exception of ______________________________________________________    6. I have been informed by my doctor of the nature and purpose of this procedure sedation, possible alternative methods of treatment, risk involved and possible complications.      Signature of Patient:_______________________________________________    Signature of person authorized to consent for patient:  _______________________________________________________________    Relationship to patient: ____________________________________________    Witness: _________________________________________ Date:___________     Physician Signature: _______________________________ Date:___________

## (undated) NOTE — LETTER
Ludy Caba, :10/29/1995    CONSENT FOR PROCEDURE/SEDATION    1. I authorize the performance upon Tarsha Nash  the following: Mirena IUD Insertion    2.  I authorize Dr. Lacey Ochoa MD (and whomever is designated as the Relationship to patient: ____________________________________________    Witness: _________________________________________ Date:___________     Physician Signature: _______________________________ Date:___________

## (undated) NOTE — MR AVS SNAPSHOT
After Visit Summary   5/26/2020    Lexy Chaidez    MRN: [id number]           Visit Information     Date & Time  5/26/2020  9:15 AM Provider  Pedro Pablo Louis MD [address] [place] [address] non-emergency, consider your options before heading to an ER. VIDEO VISITS  Visit face-to-face with a [facility] physician or   [facility] using your mobile device or computer   using [employer].    e-VISITS  Communicate with a [facility] Physician or [facility] online.  The physician patrick

## (undated) NOTE — LETTER
Ludy Caba, :10/29/1995    CONSENT FOR PROCEDURE/SEDATION    1. I authorize the performance upon Tarsha Nash  the following: Colposcopy    2.  I authorize Dr. Lacey Ochoa MD (and whomever is designated as the doctor’s Relationship to patient: ____________________________________________    Witness: _________________________________________ Date:___________     Physician Signature: _______________________________ Date:___________

## (undated) NOTE — LETTER
Ludy Caba, :10/29/1995    CONSENT FOR PROCEDURE/SEDATION    1. I authorize the performance upon Tarsha Nash  the following: Incision and drainage of cyst    2.  I authorize Dr. Lacey Ochoa MD (and whomever is designat Relationship to patient: ____________________________________________    Witness: _________________________________________ Date:___________     Physician Signature: _______________________________ Date:___________